# Patient Record
Sex: FEMALE | URBAN - METROPOLITAN AREA
[De-identification: names, ages, dates, MRNs, and addresses within clinical notes are randomized per-mention and may not be internally consistent; named-entity substitution may affect disease eponyms.]

---

## 2019-12-11 ENCOUNTER — NEW PATIENT (OUTPATIENT)
Dept: URBAN - METROPOLITAN AREA CLINIC 48 | Facility: CLINIC | Age: 44
End: 2019-12-11

## 2019-12-11 DIAGNOSIS — H25.13: ICD-10-CM

## 2019-12-11 DIAGNOSIS — D48.5: ICD-10-CM

## 2019-12-11 PROCEDURE — 99204 OFFICE O/P NEW MOD 45 MIN: CPT | Mod: 25

## 2019-12-11 PROCEDURE — 67810 INCAL BX EYELID SKN LID MRGN: CPT

## 2019-12-11 ASSESSMENT — TONOMETRY
OD_IOP_MMHG: 21
OS_IOP_MMHG: 20

## 2019-12-11 ASSESSMENT — VISUAL ACUITY
OS_CC: 20/20-2
OD_CC: 20/25

## 2021-08-12 ENCOUNTER — OFFICE VISIT (OUTPATIENT)
Dept: PSYCHOLOGY | Facility: CLINIC | Age: 46
End: 2021-08-12
Payer: COMMERCIAL

## 2021-08-12 ENCOUNTER — OFFICE VISIT (OUTPATIENT)
Dept: PSYCHIATRY | Facility: CLINIC | Age: 46
End: 2021-08-12
Payer: COMMERCIAL

## 2021-08-12 VITALS
BODY MASS INDEX: 38.99 KG/M2 | TEMPERATURE: 97.5 F | DIASTOLIC BLOOD PRESSURE: 73 MMHG | HEIGHT: 65 IN | RESPIRATION RATE: 18 BRPM | SYSTOLIC BLOOD PRESSURE: 119 MMHG | WEIGHT: 234 LBS | HEART RATE: 91 BPM

## 2021-08-12 DIAGNOSIS — F31.4 BIPOLAR DISORDER, CURRENT EPISODE DEPRESSED, SEVERE, WITHOUT PSYCHOTIC FEATURES (HCC): Primary | ICD-10-CM

## 2021-08-12 DIAGNOSIS — F43.10 POST TRAUMATIC STRESS DISORDER (PTSD): ICD-10-CM

## 2021-08-12 PROBLEM — I10 HYPERTENSION: Status: ACTIVE | Noted: 2021-08-12

## 2021-08-12 PROBLEM — F31.9 AFFECTIVE PSYCHOSIS, BIPOLAR (HCC): Status: ACTIVE | Noted: 2021-08-12

## 2021-08-12 PROCEDURE — 90792 PSYCH DIAG EVAL W/MED SRVCS: CPT | Performed by: PSYCHIATRY & NEUROLOGY

## 2021-08-12 RX ORDER — FAMOTIDINE 20 MG/1
20 TABLET, FILM COATED ORAL
COMMUNITY
Start: 2021-07-18

## 2021-08-12 RX ORDER — LITHIUM CARBONATE 300 MG/1
300 CAPSULE ORAL
COMMUNITY
Start: 2021-08-10

## 2021-08-12 RX ORDER — LITHIUM CARBONATE 600 MG/1
600 CAPSULE ORAL 2 TIMES DAILY
COMMUNITY
Start: 2021-08-10

## 2021-08-12 RX ORDER — FLUTICASONE PROPIONATE 50 MCG
1 SPRAY, SUSPENSION (ML) NASAL DAILY
COMMUNITY

## 2021-08-12 RX ORDER — MONTELUKAST SODIUM 10 MG/1
10 TABLET ORAL
COMMUNITY
Start: 2021-07-15

## 2021-08-12 RX ORDER — PRAZOSIN HYDROCHLORIDE 2 MG/1
4 CAPSULE ORAL
COMMUNITY
Start: 2021-08-10

## 2021-08-12 RX ORDER — CLINDAMYCIN PHOSPHATE 10 UG/ML
1 LOTION TOPICAL 2 TIMES DAILY
COMMUNITY
Start: 2021-03-01 | End: 2022-03-01

## 2021-08-12 RX ORDER — PANTOPRAZOLE SODIUM 40 MG/1
40 TABLET, DELAYED RELEASE ORAL DAILY
COMMUNITY

## 2021-08-12 RX ORDER — CARIPRAZINE 1.5 MG/1
1.5 CAPSULE, GELATIN COATED ORAL DAILY
COMMUNITY
Start: 2021-08-10 | End: 2021-09-13 | Stop reason: SDUPTHER

## 2021-08-12 RX ORDER — SPIRONOLACTONE 25 MG/1
25 TABLET ORAL DAILY
COMMUNITY
Start: 2021-07-18

## 2021-08-12 RX ORDER — QUINIDINE GLUCONATE 324 MG
27 TABLET, EXTENDED RELEASE ORAL DAILY
COMMUNITY

## 2021-08-12 RX ORDER — GABAPENTIN 600 MG/1
600 TABLET ORAL
COMMUNITY
Start: 2021-07-18

## 2021-08-12 RX ORDER — AMLODIPINE BESYLATE 10 MG/1
10 TABLET ORAL DAILY
COMMUNITY
Start: 2021-07-18
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
## 2021-08-12 NOTE — PSYCH
Assessment/Plan:      Diagnoses and all orders for this visit:    Bipolar disorder, current episode depressed, severe, without psychotic features ([facility] [address] )    Post traumatic stress disorder (PTSD)          Subjective:     Patient ID: Joycelyn Ng is a 51 y o  female  [facility] Treatment Plan   AREAS OF NEED: Bipolar disorder and PTSD as evidenced by increased anxiety, depression, suicidal attempts on medications and isolation due to limited supports and separation from  and traumatic history  Date Initiated: 08/12/21    Strengths: "smart, kind, hard working, determined and resilient"     LONG TERM GOAL:   Date Initiated: 08/12/21  1 0 I will identify 3 ways my suicidal ideation and pseudoseizures have subsided  Target Date: 09/09/21  Completion Date:       SHORT TERM OBJECTIVES:     Date Initiated: 08/12/21  1 1 I will learn 3 coping skills to decrease my anxiousness and depressive symptoms  Revision Date:   Target Date: 08/24/21  Completion Date:     Date Initiated: 08/12/21  1 2 I will identify 3 ways that I have attempted reconnections with my children  Revision Date:   Target Date: 08/24/21  Completion Date:    Date Initiated: 08/12/21  1 3 I will take medications as prescribed and share questions and concerns if arise  Revision Date:  Target Date: 08/24/21  Completion Date:     Date Initiated: 08/12/21  1 4 I will identify 3 ways my supports can assist in my recovery and agree to staff/support contact as indicated      Revision Date:  Target Date: 08/24/21  Completion Date:          7 DAY REVISION:    Date Initiated:  Revision Date:   Target Date:   Completion Date:      PSYCHIATRY:  Date Initiated:  08/12/21  Medication Management and Education       Revision Date:       The person(s) responsible for carrying out the plan is Tena ePrez MD    NURSING/SYMPTOM EDUCATION:  Date Initiated: 08/12/21       1 1, 1 2  1 3, 1 4 Provide wellness/symptoms and skill education groups three to five days weekly to educate [place] Corre[place]  on signs and symptoms of diagnoses, skills to manage stressors, and medication questions that will be addressed by the treatment team         Revision date: The person(s) responsible for carrying out the plan is NANCY Turner, HAILEY and Dougie Hsu [place]    PSYCHOLOGY:   Date Initiated: 08/12/21       1 1, 1 2, 1 4 Provide psychotherapy group 5 times per week to allow opportunity for Joycelyn Varghese  to explore stressors and ways of coping  Revision Date:   The person(s) responsible for carrying out the plan is Dinesh Pereira [place]    ALLIED THERAPY:   Date Initiated: 08/12/21  1 1,1 2 Engage Joycelyn Varghese in AT group 5 times daily to encourage development and use of wellness tools to decrease symptoms and promote recovery through meaningful activity  Revision Date:       The person(s) responsible for carrying out the plan is ADAMA Jacobs and ADAMA Zazueta    CASE MANAGEMENT:   Date Initiated: 08/12/21      1 0 This  will meet with Joycelyn Varghese  3-4 times weekly to assess treatment progress, discharge planning, connection to community supports and UR as indicated  Revision Date:   The person(s) responsible for carrying out the plan is Darrick Guerin MA, [place]    TREATMENT REVIEW/COMMENTS:     DISCHARGE CRITERIA: Identify 3 signs of progress and complete relapse prevention plan  DISCHARGE PLAN: Connect with identified outpatient providers  Estimated Length of Stay: 10 treatment days       Diagnosis and Treatment Plan explained to April, April relates understanding diagnosis and is agreeable to Treatment Plan           CLIENT COMMENTS / Please share your thoughts, feelings, need and/or experiences regarding your treatment plan: _____________________________________________________________________________________________________________________________________________________________________________________________________________________________________________________________________________________________________________________ Date/Time: ______________     Patient Signature: _________________________________     Date/Time: ______________      Signature: _________________________________    Date/Time: ______________

## 2021-08-12 NOTE — PSYCH
Subjective:     Patient ID: April Laure Meckel is a 51 y o  female  [facility] Clinical Progress Notes      Specialized Services Documentation  Therapist must complete separate progress note for each specific clinical activity in which the individual participated during the day  Other ([id number]) Spoke with Tapan Sanon from [employer] with a contact number [phone], using Tax ID [id number] Location address [address] [place] [address] [id number] not available for authorization  Joycelyn Varghese was authorized 7 days from 08/13/21 to 08/23/21 with an authorization code of [id number]  Case Management Note    Dinesh Pereira [place]    Current suicide risk : Low    ([id number]) Met with Joycelyn Varghese  Reviewed program and given on call number  she completed releases and OP providers/ PCP notified of admission and health care coordination form completed  Completed initial psycho-social evaluation and initial treatment goals discussed  Medications changes/added/denied? No - See Dr Cas Littlejohn Note    Treatment session number: Assessment only    Individual Case Management Visit provided today?  No    [facility] follow up physician's orders: Admit to PHP - See Dr Cas Littlejohn Note

## 2021-08-13 ENCOUNTER — OFFICE VISIT (OUTPATIENT)
Dept: PSYCHOLOGY | Facility: CLINIC | Age: 46
End: 2021-08-13
Payer: COMMERCIAL

## 2021-08-13 DIAGNOSIS — F31.4 BIPOLAR DISORDER, CURRENT EPISODE DEPRESSED, SEVERE, WITHOUT PSYCHOTIC FEATURES (HCC): Primary | ICD-10-CM

## 2021-08-13 DIAGNOSIS — F43.10 POST TRAUMATIC STRESS DISORDER (PTSD): ICD-10-CM

## 2021-08-13 PROCEDURE — G0177 OPPS/PHP; TRAIN & EDUC SERV: HCPCS

## 2021-08-13 PROCEDURE — G0410 GRP PSYCH PARTIAL HOSP 45-50: HCPCS

## 2021-08-13 PROCEDURE — G0176 OPPS/PHP;ACTIVITY THERAPY: HCPCS

## 2021-08-13 NOTE — PSYCH
Subjective:     Patient ID: Joycelyn Lantigua is a 51 y o  female  [facility] Clinical Progress Notes      Specialized Services Documentation  Therapist must complete separate progress note for each specific clinical activity in which the individual participated during the day  Allied Therapy   [id number] April Sushila Varghese  actively shared in [facility] group exploring DBT skill changing emotional responses  April engaged in task sharing triggers and responses to given emotions  Group explored differences between justified and unjustified emotions to prompting events and effective versus ineffective responses  With weekend approach, group explored risk of feeling lazy and she was able to identify risks of inactivity and the need to encourage herself to stay active  Group reviewed skill Opposite Action related to depression withdraw versus get active and productive weekend choices offered  Some beginning effort and progress noted toward goals  Continue AT to explore healthy emotional regulation and role in practicing wellness tools   Tx Plan Objective: 1 1,1 2, Therapist:  Genia GALAN

## 2021-08-13 NOTE — PSYCH
Subjective:     Patient ID: April Laure Meckel is a 51 y o  female  [facility] Clinical Progress Notes      Specialized Services Documentation  Therapist must complete separate progress note for each specific clinical activity in which the individual participated during the day  Group Psychotherapy [id number] April participated in a group that started with a mindfulness technique of progressive muscle relaxation  After finishing our mindfulness exercise April participated in an open discussion card game called self-care empowerment  Each card would express or impose a question or way to empower an area of their life  April will continue with life skills and psychotherapy groups  Good progress made towards treatment  Tx Plan Objective: 1 1,1 2 Therapist:  Dougie Hsu, [facility]    Education Therapy   [phone] Contreras Varghese actively shared in morning assessment and goal review  Presented as Receptive related to readiness to learn  Contreras Varghese did complete goal from last treatment day identifying gaining hope  did not present with any barriers to learning  [phone] April Sushila Varghese engaged throughout the treatment day  Was engaged in learning related to Illness, Medication, Aftercare and Wellness Tools  Staff utilized Verbal, Written, A/V and Demonstration teaching methods  Joycelyn Varghese shared area of learning and set a goal for outside of program to relax practicing Mindfulness skills        Tx Plan Objective: 1 1,1 2 Therapist:  VANESSA Luis

## 2021-08-13 NOTE — PSYCH
Subjective:     Patient ID: Joycelyn Lopez is a 51 y o  female  [facility] Clinical Progress Notes      Specialized Services Documentation  Therapist must complete separate progress note for each specific clinical activity in which the individual participated during the day  GROUP PSYCHOTHERAPY ([id number])  The group engaged in the wellness assessment that evaluates progress on several different areas of wellness: physical, emotional, cognitive, vocational, social and spiritual  Clients rated their progress and discussed areas that need work  April continues to make progress towards goals through verbal participation in group  Continue with psychotherapy  TX Plan Objectives: 1 1, 1 2, 1 4  Therapist: Amanda Dickson MA, [place]     Other Spoke with representative from [facility]  Stated there is no waitlist for dual residential treatment for women at this time  Case Management Note    Amanda Dickson, [facility] [place], [place]    Current suicide risk : Low     ([id number]) April shared that she is worried that PHP may not be the right program for her  She shared that she gave it thought and she is agreeable to trying more days to see if the anxiety was from it being a new experience  April talked about residential being a better fit but she didn't know where she could go  Provided information on [facility] treatment center  Medications changes/added/denied? No    Treatment session number: 1    Individual Case Management Visit provided today? Yes     [facility] follow up physician's orders: None at this time

## 2021-08-16 ENCOUNTER — OFFICE VISIT (OUTPATIENT)
Dept: PSYCHOLOGY | Facility: CLINIC | Age: 46
End: 2021-08-16
Payer: COMMERCIAL

## 2021-08-16 DIAGNOSIS — F31.4 BIPOLAR DISORDER, CURRENT EPISODE DEPRESSED, SEVERE, WITHOUT PSYCHOTIC FEATURES (HCC): Primary | ICD-10-CM

## 2021-08-16 DIAGNOSIS — F43.10 POST TRAUMATIC STRESS DISORDER (PTSD): ICD-10-CM

## 2021-08-16 PROCEDURE — G0410 GRP PSYCH PARTIAL HOSP 45-50: HCPCS

## 2021-08-16 PROCEDURE — G0177 OPPS/PHP; TRAIN & EDUC SERV: HCPCS

## 2021-08-16 PROCEDURE — G0176 OPPS/PHP;ACTIVITY THERAPY: HCPCS

## 2021-08-16 NOTE — PSYCH
Subjective:     Patient ID: Joycelyn Mosley is a 51 y o  female  [facility] Clinical Progress Notes      Specialized Services Documentation  Therapist must complete separate progress note for each specific clinical activity in which the individual participated during the day  Allied Therapy   [id number] Joycelyn Varghese actively shared and participated in [facility] group focused on using chants and mantras for empowerment  Joycelyn Varghese engaged in active music making, vocalizing, chant writing, and group discussion  Group explored practice of learning what a chant is, when to use chants in their life, and two specific chants with melodies they can use in their daily life  German Anandjerica Castaneday identified that she felt very connected during group  She stated she felt very empowered and stress free during the chant exercise  Some effort noted toward treatment goal  Continue AT to encourage the development and practice of chants and mantras to alleviate symptoms and support wellness    Tx Plan Objective: 1 1, Therapist:  ADAMA Stokes

## 2021-08-16 NOTE — PSYCH
Subjective:     Patient ID: Joycelyn Howell is a 51 y o  female  [facility] Clinical Progress Notes      Specialized Services Documentation  Therapist must complete separate progress note for each specific clinical activity in which the individual participated during the day  GROUP PSYCHOTHERAPY ([id number]) Members were asked Meme Monroe are you grateful for?  They were given a prompt to complete to help them recognize different areas they could show some gratitude  The group was educated on the importance of being grateful and effective times to utilize this wellness tool  Members actively engaged in discussions about overcoming challenges and areas that they have demonstrated strengths and progress  April presented as insightful, evident by identifying that when she is not well it is harder to identify areas of gratitude  Encouraged to try to list gratitude even though she may be feeling an emotion  Joycelyn Conti Deputy continues to demonstrate insight and progress through activity participation and verbal disclosures in group  Continue with psychotherapy  TX Plan Objectives: 1 1, 1 2, 1 4   Therapist: John Summers MA, [place]     Case Management Note    John Summers MA, [place]    Current suicide risk : Low     A case management session is not scheduled today with Joycelyn Varghese; additionally, they did not request a CM meeting  Next scheduled session is 08/17/21  Medications changes/added/denied? No    Treatment session number: 2     Individual Case Management Visit provided today?  No    [facility] follow up physician's orders: None at this time

## 2021-08-16 NOTE — PSYCH
Subjective:     Patient ID: Joycelyn Howell is a 51 y o  female  [facility] Clinical Progress Notes      Specialized Services Documentation  Therapist must complete separate progress note for each specific clinical activity in which the individual participated during the day  Group Psychotherapy   [id number] Joycelyn Varghese actively participated in the [facility] group focused on increasing awareness to emotions  Joycelyn Varghese engaged in a minor analysis as well as a drumming exercise where group members were asked to express a given emotion through playing a drum  Group explored healthy ways to express emotions as well as how to put it into practice  Joycelyn Varghese shared that she struggled with feeling emotions and learned the PLEASE acronym  Good effort noted toward treatment goal  Continue PT to encourage development and practice of expressing emotions  Tx Plan Objective: 1 1,1 2, Therapist:  Reynaldo JONESBC; GLADIS Rogel    Education Therapy   [phone] Joycelyn Varghese actively shared in morning assessment and goal review  Presented as Receptive related to readiness to learn  Chente Person  did complete goal from last treatment day identifying gaining hope  April did not present with any barriers to learning  [phone] Joycelyn Varghese engaged throughout the treatment day  Was engaged in learning related to Illness, Medication, Aftercare and Wellness Tools  Staff utilized Verbal, Written, A/V and Demonstration teaching methods    Joycelyn Varghese shared area of learning and set a goal for outside of program to revisit gratitude journal       Tx Plan Objective: 1 1,1 2, Therapist:  John Summers MA, [facility]; GLADIS Rogel

## 2021-08-17 ENCOUNTER — OFFICE VISIT (OUTPATIENT)
Dept: PSYCHOLOGY | Facility: CLINIC | Age: 46
End: 2021-08-17
Payer: COMMERCIAL

## 2021-08-17 DIAGNOSIS — F31.4 BIPOLAR DISORDER, CURRENT EPISODE DEPRESSED, SEVERE, WITHOUT PSYCHOTIC FEATURES (HCC): Primary | ICD-10-CM

## 2021-08-17 DIAGNOSIS — F43.10 POST TRAUMATIC STRESS DISORDER (PTSD): ICD-10-CM

## 2021-08-17 PROCEDURE — G0177 OPPS/PHP; TRAIN & EDUC SERV: HCPCS

## 2021-08-17 PROCEDURE — G0176 OPPS/PHP;ACTIVITY THERAPY: HCPCS

## 2021-08-17 PROCEDURE — G0410 GRP PSYCH PARTIAL HOSP 45-50: HCPCS

## 2021-08-17 NOTE — PSYCH
Subjective:     Patient ID: April Deana Howell is a 51 y o  female  [facility] Clinical Progress Notes      Specialized Services Documentation  Therapist must complete separate progress note for each specific clinical activity in which the individual participated during the day  Group Psychotherapy Life Skills(9:30-10:30)  Group members discussed the importance of mindfulness and talking about a time when they were mindful  Group members played the mindfulness game which consisted of true/false statements, visualization cards, and mindfulness practice cards  Group members engaged in mindfulness activities  April continues to make progress towards goals through verbal participation in group; to accomplish long term goals continue to utilize skills learned in programming  Continue with psychotherapy to educate and encourage use of wellness tools   Tx Plan Objective: 1 1,1 2 Therapist: Angel Smith and co-led by Juan Jose Lovett

## 2021-08-17 NOTE — PSYCH
Subjective:     Patient ID: Joycelyn Campos is a 51 y o  female  [facility] Clinical Progress Notes      Specialized Services Documentation  Therapist must complete separate progress note for each specific clinical activity in which the individual participated during the day  GROUP PSYCHOTHERAPY ([id number])  The group was educated on the grounding technique called Emotional Freedom Tapping (EFT)  Members practiced EFT by following along to a prompt titled "Overcoming Exhaustion and 3 Minute for Anxiety"  Members rated the intensity of their thoughts before and after participating in the tapping exercise  Group discussed scenarios where they could use the technique, how to adapt the practice to their needs and understanding insights from past  April continues to make progress towards goals through verbal participation and engagement in activity during group  Continue with Psychotherapy  [facility] Objectives: 1 1, 1 2, 1 4   Therapist: Mauricio Storey MA, [place], co-led by Webb Apgar, MTI     Case Management Note    Mauricio Storey MA, [place]    Current suicide risk : Low     A case management session is not scheduled today with April Sushila Varghese; additionally, they did not request a CM meeting  Next scheduled session is 21  Medications changes/added/denied? No    Treatment session number: 3    Individual Case Management Visit provided today?  No    [facility] follow up physician's orders: None at this time

## 2021-08-17 NOTE — PSYCH
Subjective:     Patient ID: Joycelyn De La Cruz is a 51 y o  female  [facility] Clinical Progress Notes      Specialized Services Documentation  Therapist must complete separate progress note for each specific clinical activity in which the individual participated during the day  Allied Therapy   [id number]- Edmundo Varghese actively shared and participated in [facility] group focused on using chants and mantras for assisting in triggering an alternative behavior (I e  I will ______________, I will not _____________ ) Participants created 4 different chants related to identifying a 2 part statement of what they will engage in and not engage in, a mantra to encourage deep breathing when feeling overwhelmed, and a mantra related to outlining a coping skill, and an affirmation chant  Joycelyn Varghese engaged in active music making, vocalizing, chant writing, and group discussion  Group explored practice of learning what a chant is, creating their own chants through song writing, and having an open discussion about the process  Edmundo Varghese identified utilizing both the body for percussion and chanting a calming mantra is helpful as it slows down the brain and decreases its ability for ruminating as it requires a lot of focus  Each participant identified an accountability plan of when they will use their chant by identifying where and when they will be using the chant for the next 6 months, as well as how they can keep themselves accountable (I e  setting an alarm on their phone, having a person ask them if they used their chant, etc )  Some effort noted toward treatment goal   Continue AT to encourage the development and practice of chants and mantras to alleviate symptoms and support wellness    Tx Plan Objective: 1 1, Therapist:  ADAMA Hendricks, GLADIS Villanueva

## 2021-08-18 ENCOUNTER — OFFICE VISIT (OUTPATIENT)
Dept: PSYCHOLOGY | Facility: CLINIC | Age: 46
End: 2021-08-18
Payer: COMMERCIAL

## 2021-08-18 ENCOUNTER — OFFICE VISIT (OUTPATIENT)
Dept: PSYCHIATRY | Facility: CLINIC | Age: 46
End: 2021-08-18
Payer: COMMERCIAL

## 2021-08-18 DIAGNOSIS — F31.9 BIPOLAR 1 DISORDER (HCC): Primary | ICD-10-CM

## 2021-08-18 DIAGNOSIS — F43.10 POST TRAUMATIC STRESS DISORDER (PTSD): ICD-10-CM

## 2021-08-18 DIAGNOSIS — F31.4 BIPOLAR DISORDER, CURRENT EPISODE DEPRESSED, SEVERE, WITHOUT PSYCHOTIC FEATURES (HCC): Primary | ICD-10-CM

## 2021-08-18 PROCEDURE — G0410 GRP PSYCH PARTIAL HOSP 45-50: HCPCS

## 2021-08-18 PROCEDURE — 99214 OFFICE O/P EST MOD 30 MIN: CPT | Performed by: NURSE PRACTITIONER

## 2021-08-18 RX ORDER — BENZTROPINE MESYLATE 0.5 MG/1
0.5 TABLET ORAL 2 TIMES DAILY
Qty: 60 TABLET | Refills: 2 | Status: SHIPPED | OUTPATIENT
Start: 2021-08-18

## 2021-08-18 NOTE — PSYCH
Subjective:     Patient ID: Joycelyn Slaughter is a 51 y o  female  [facility] Clinical Progress Notes      Specialized Services Documentation  Therapist must complete separate progress note for each specific clinical activity in which the individual participated during the day  Allied Therapy  [id number] April Sushila Varghese did not attend due to feeling ill  Education Therapy   [id number] Sohan Varghese actively shared in morning assessment and goal review  Presented as Receptive related to readiness to learn  April Sushila Varghese did complete goal from last treatment day identifying gaining responsibility, support and empowerment  April did not present with any barriers to learning  [phone] April Sushila Varghese did not attend due to feeling ill

## 2021-08-18 NOTE — PSYCH
PHP MEDICATION MANAGEMENT NOTE        [facility]    Name and Date of Birth:  Joycelyn Varghese 51 y o  1975 MRN: [id number]    Date of Visit: August 18, 2021    Allergies   Allergen Reactions    Amoxicillin Hives and Rash     SUBJECTIVE:    April is seen today for a follow up for Bipolar Disorder and PTSD  She reports that she has improved slightly since beginning PHP  Patient feels that Gemma Noa has been helpful in alleviating feelings of depression  Patient continues to experience SI at times  "It's like a bad coping mechanism that help me feel better "  In addition she is experiencing some medication side effects  Patient is visibly restless and reports restlessness at nighttime interfering with sleep  Has had difficulty with akathisia in the past from atypical antipsychotic such as Latuda  Patient has also had discharge from breasts and atypical menstrual cycle since starting Vraylar  She also experiences GI upset and diarrhea  Diarrhea onset may coincide with increase in lithium  Patient will continue to monitor  Will start Cogentin to address akathisia and continue to monitor the patient  She denies any side effects from medications  PLAN:    Initiate Cogentin 0 5 mg p o  b i d    continue all other psychiatric medications as ordered   continue consider the risk benefits of Gemma Noa especially in regards to side effect profile akathesia and reproductive system dysfunction  Aware of 24 hour and weekend coverage for urgent situations accessed by calling [facility] main practice number  Continue partial hospitalization program    Diagnoses and all orders for this visit:    Bipolar 1 disorder ([facility] [address] )  -     benztropine (COGENTIN) 0 5 mg tablet;  Take 1 tablet (0 5 mg total) by mouth 2 (two) times a day    Post traumatic stress disorder (PTSD)        Current Outpatient Medications on File Prior to Visit   Medication Sig Dispense Refill    Advair Diskus 500-50 MCG/DOSE inhaler Inhale 1 puff 2 (two) times a day      amLODIPine (NORVASC) 10 mg tablet Take 10 mg by mouth daily      Cholecalciferol 25 MCG (1000 UT) tablet Take 1,000 Units by mouth      clindamycin (CLEOCIN T) 1 % lotion Apply 1 application topically 2 (two) times a day      famotidine (PEPCID) 20 mg tablet Take 20 mg by mouth daily at bedtime      ferrous gluconate (FERGON) 240 (27 FE) MG tablet Take 27 mg by mouth daily      fluticasone (FLONASE) 50 mcg/act nasal spray 1 spray into each nostril daily      gabapentin (NEURONTIN) 600 MG tablet Take 600 mg by mouth daily at bedtime      lithium 600 MG capsule Take 600 mg by mouth 2 (two) times a day      lithium carbonate 300 mg capsule Take 300 mg by mouth daily at bedtime      montelukast (SINGULAIR) 10 mg tablet Take 10 mg by mouth daily at bedtime      pantoprazole (PROTONIX) 40 mg tablet Take 40 mg by mouth daily      prazosin (MINIPRESS) 2 mg capsule Take 4 mg by mouth daily at bedtime      spironolactone (ALDACTONE) 25 mg tablet Take 25 mg by mouth daily      Vraylar 1 5 MG capsule Take 1 5 mg by mouth daily       No current facility-administered medications on file prior to visit  Psychotherapy Provided:     Individual psychotherapy provided: Yes  Counseling was provided during the session today for 16 minutes  Supportive counseling provided  HPI ROS Appetite Changes and Sleep:     She reports difficulty falling asleep, adequate appetite, low energy  yes, no plan   States passive SI without plan or intent    Review Of Systems:      General emotional problems, decreased functioning, sleep disturbances and marital problems   Personality no change in personality   Constitutional negative   ENT negative   Cardiovascular negative   Respiratory negative   Gastrointestinal negative   Genitourinary negative   Musculoskeletal negative   Integumentary negative   Neurological negative   Endocrine negative   Other Symptoms none, all other systems are negative     Mental Status Evaluation:    Appearance Adequate hygiene and grooming   Behavior cooperative and psychomotor agitation   Mood anxious and depressed  Depression Scale -  of 10 (0 = No depression)  Anxiety Scale -  of 10 (0 = No anxiety)   Speech Normal rate and volume   Affect appropriate and mood-congruent   Thought Processes Goal directed and coherent   Thought Content Does not verbalize delusional material   Associations Tightly connected   Perceptual Disturbances Denies hallucinations and does not appear to be responding to internal stimuli   Risk Potential Suicidal/Homicidal Ideation - No evidence of suicidal or homicidal ideation and patient does not verbalize suicidal or homicidal ideation  Risk of Violence - No evidence of risk for violence found on assessment  Risk of Self Mutilation - No evidence of risk for self mutilation found on assessment   Orientation oriented to person, place, time/date and situation   Memory recent and remote memory grossly intact   Consciousness alert and awake   Attention/Concentration attention span and concentration are age appropriate   Insight intact   Judgement intact   Muscle Strength and Gait normal muscle strength and normal muscle tone, normal gait/station and normal balance   Motor Activity no abnormal movements   Language no difficulty naming common objects, no difficulty repeating a phrase, no difficulty writing a sentence   Fund of Knowledge adequate knowledge of current events  adequate fund of knowledge regarding past history  adequate fund of knowledge regarding vocabulary      Past Psychiatric History Update:     Inpatient Psychiatric Admission Since Last Encounter:   no  Suicide Attempt Or Self Mutilation Since Last Encounter:   no  Incidence of Violent Behavior Since Last Encounter:   no    Traumatic History Update:     New Onset of Abuse Since Last Encounter:   no  Traumatic Events Since Last Encounter:   no    Past Medical History:    Past Medical History:   Diagnosis Date    Head injury      Past Medical History Pertinent Negatives:   Diagnosis Date Noted    Seizures ([facility] [address] ) 2021     Past Surgical History:   Procedure Laterality Date    BACK SURGERY       SECTION      CYST REMOVAL       Allergies   Allergen Reactions    Amoxicillin Hives and Rash     Substance Abuse History:    Social History     Substance and Sexual Activity   Alcohol Use Not Currently    Comment: 2021     Social History     Substance and Sexual Activity   Drug Use Not Currently     Social History:    Social History     Socioeconomic History    Marital status: /Civil Union     Spouse name: Not on file    Number of children: 3    Years of education: Not on file    Highest education level: Bachelor's degree (e g , BA, AB, BS)   Occupational History    Occupation: short term disability   Tobacco Use    Smoking status: Current Some Day Smoker     Types: Cigarettes    Smokeless tobacco: Current User     Types: Snuff    Tobacco comment: 1/2 a pack a week   Vaping Use    Vaping Use: Every day    Substances: Nicotine   Substance and Sexual Activity    Alcohol use: Not Currently     Comment: 2021    Drug use: Not Currently    Sexual activity: Not on file   Other Topics Concern    Not on file   Social History Narrative    Not on file     Social Determinants of Health     Financial Resource Strain:     Difficulty of Paying Living Expenses:    Food Insecurity:     Worried About Running Out of Food in the Last Year:     [address] in the Last Year:    Transportation Needs:     Lack of Transportation (Medical):      Lack of Transportation (Non-Medical):    Physical Activity:     Days of Exercise per Week:     Minutes of Exercise per Session:    Stress:     Feeling of Stress :    Social Connections:     Frequency of Communication with Friends and Family:     Frequency of Social Gatherings with Friends and Family:     Attends Baptism Services:     Active Member of Clubs or Organizations:     Attends Club or Organization Meetings:     Marital Status:    Intimate Partner Violence:     Fear of Current or Ex-Partner:     Emotionally Abused:     Physically Abused:     Sexually Abused:      Family Psychiatric History:     Family History   Problem Relation Age of Onset    Alcohol abuse Mother     Post-traumatic stress disorder Father     Depression Sister     Depression Brother     Completed Suicide  Paternal Aunt      History Review: The following portions of the patient's history were reviewed and updated as appropriate: allergies, current medications, past family history, past medical history, past social history, past surgical history and problem list     OBJECTIVE:     Vital signs in last 24 hours: There were no vitals filed for this visit  Laboratory Results: I have personally reviewed all pertinent laboratory/tests results  Medications Risks/Benefits:      Risks, Benefits And Possible Side Effects Of Medications:    Discussed risks and benefits of treatment with patient including risk of suicidality, serotonin syndrome and SIADH related to treatment with antidepressants; Risk of induction of manic symptoms in certain patient populations     Controlled Medication Discussion:     Not applicable    EVA Rose 08/18/21    This note was shared with patient

## 2021-08-18 NOTE — PSYCH
Subjective:     Patient ID: Jocyelyn Ng is a 51 y o  female  [facility] Clinical Progress Notes      Specialized Services Documentation  Therapist must complete separate progress note for each specific clinical activity in which the individual participated during the day  GROUP PSYCHOTHERAPY ([id number]) Members were asked 2 questions to discuss and share responses with the group  1  On a scale from 1 to 10, where are you at in your recovery/wellness journey and what does that number mean to you?   2  Share one thing you would like to improve about yourself and discuss one way you can accomplish this  Each member discuss where they fell on the scale and what would be needed to increase their number by a half a point  The group received a safe and non-judgmental space to discuss concerns and celebrate victories  Open discussion revolved around gaining information on the following areas: W R A P , mindfulness and mental health advanced directives  April initiated the discussion about mental health advanced directives to be prepared for relapse or back slides  Rosella Goldmann April Sushila Palisades continues to demonstrate insight and progress through activity participation and verbal disclosures in group  Continue with psychotherapy  TX Plan Objectives: 1 1, 1 2, 1 4   Therapist: Gen Moreno MA, [place]        Case Management Note    Dinesh Rivas [place]    Current suicide risk : Low     ([id number]) April shared that she had been having stomach issues all throughout the morning  She stated she was going to try to stay for the day but she may have to leave if it gets too bad  (4368) Shared that she was not feeling well and that she was going to leave for the day  Stated she will be in program tomorrow  Medications changes/added/denied? Yes - See Jessica Samayoa' Note    Treatment session number: 0    Individual Case Management Visit provided today?  Yes     [facility] follow up physician's orders: None at this time

## 2021-08-18 NOTE — PSYCH
Subjective:     Patient ID: Joycelyn Mosley is a 51 y o  female  [facility] Clinical Progress Notes      Specialized Services Documentation  Therapist must complete separate progress note for each specific clinical activity in which the individual participated during the day  Group Psychotherapy Life Skills (12:30-1:30) Joycelyn Conti  was not present in group which focused on hope  April left program early due to being sick    Tx Plan Objective: NA Therapist: Fareed Honeycutt

## 2021-08-19 ENCOUNTER — OFFICE VISIT (OUTPATIENT)
Dept: PSYCHOLOGY | Facility: CLINIC | Age: 46
End: 2021-08-19
Payer: COMMERCIAL

## 2021-08-19 DIAGNOSIS — F43.10 POST TRAUMATIC STRESS DISORDER (PTSD): ICD-10-CM

## 2021-08-19 DIAGNOSIS — F31.4 BIPOLAR DISORDER, CURRENT EPISODE DEPRESSED, SEVERE, WITHOUT PSYCHOTIC FEATURES (HCC): Primary | ICD-10-CM

## 2021-08-19 PROCEDURE — G0410 GRP PSYCH PARTIAL HOSP 45-50: HCPCS

## 2021-08-19 PROCEDURE — G0176 OPPS/PHP;ACTIVITY THERAPY: HCPCS

## 2021-08-19 PROCEDURE — G0177 OPPS/PHP; TRAIN & EDUC SERV: HCPCS

## 2021-08-19 NOTE — PSYCH
Subjective:     Patient ID: Joycelyn Saucedo is a 51 y o  female  [facility] Clinical Progress Notes      Specialized Services Documentation  Therapist must complete separate progress note for each specific clinical activity in which the individual participated during the day  Education Therapy   [id number] Osmin Katienaun  actively shared in morning assessment and goal review  Presented as Receptive related to readiness to learn  Osmin Nela Castaneday did complete goal from last treatment day identifying gaining support  April did not present with any barriers to learning  [id number] Joycelyn Varghese engaged throughout the treatment day  Was engaged in learning related to Illness, Medication, Aftercare and Wellness Tools  Staff utilized Verbal, Written, A/V and Demonstration teaching methods  April Sushila Castaneday shared area of learning and set a goal for outside of program to take her kid on a walk        Tx Plan Objective: 1 1,1 2, Therapist:  NANCY Rudd; GLADIS Graham

## 2021-08-19 NOTE — PSYCH
Subjective:     Patient ID: Joycelyn Feliciano is a 51 y o  female  [facility] Clinical Progress Notes      Specialized Services Documentation  Therapist must complete separate progress note for each specific clinical activity in which the individual participated during the day  GROUP PSYCHOTHERAPY ([id number]) Members requested to talk about how to approach returning to work after being in treatment  Others offered suggestions on how to phrase statements if questioned  Members were educated on protected health information and advocating for self in the workplace  The group brainstormed an A-Z list of coping skills that they would be able to use when they are overwhelmed at work or in their daily lives  Joycelyn Conti  continues to demonstrate insight and progress through verbal participation in group  Continue with psychotherapy  TX Plan Objectives: 1 1, 1 2, 1 4   Therapist: Tiara Cantrell MA, [place]     Case Management Note    Tiara Cantrell MA, [place]    Current suicide risk : Low     ([phone]) April discussed the CM about the possibility of a family session  April discussed with CM about what is next in terms of treatment  Encouraged April to think about what she wants and start to discuss it with family about needs  Medications changes/added/denied? No    Treatment session number: 4    Individual Case Management Visit provided today? Yes     [facility]ations follow up physician's orders: None at this time

## 2021-08-20 ENCOUNTER — OFFICE VISIT (OUTPATIENT)
Dept: PSYCHOLOGY | Facility: CLINIC | Age: 46
End: 2021-08-20
Payer: COMMERCIAL

## 2021-08-20 DIAGNOSIS — F43.10 POST TRAUMATIC STRESS DISORDER (PTSD): ICD-10-CM

## 2021-08-20 DIAGNOSIS — F31.4 BIPOLAR DISORDER, CURRENT EPISODE DEPRESSED, SEVERE, WITHOUT PSYCHOTIC FEATURES (HCC): Primary | ICD-10-CM

## 2021-08-20 PROCEDURE — G0177 OPPS/PHP; TRAIN & EDUC SERV: HCPCS

## 2021-08-20 PROCEDURE — G0176 OPPS/PHP;ACTIVITY THERAPY: HCPCS

## 2021-08-20 PROCEDURE — G0410 GRP PSYCH PARTIAL HOSP 45-50: HCPCS

## 2021-08-20 NOTE — PSYCH
Subjective:     Patient ID: Joycelyn Chamberlain is a 51 y o  female  [facility] Clinical Progress Notes      Specialized Services Documentation  Therapist must complete separate progress note for each specific clinical activity in which the individual participated during the day  Allied Therapy   [id number] Chamberlain actively shared in [facility] group focused on social connection, rewiring unhealthy thoughts related to loneliness, and setting daily intentions related to how we are utilizing our coping skills  Joycelyn Conti Deputy engaged in group by sharing personal reflections, music listening, related to minor analysis and journaling during reflection time  Group explored practice of minor analysis, utilizing self-reflection questions to guide thought processes related to loneliness, and shared with the group during minor analysis  Joycelyn Varghese shared about feeling lonely on a daily basis regarding her mental health journey and that it really taught her who are true friends are  Group processed the perspective is it that they aren't a friend or that they are uncomfortable with the "unpretty or un-preferred" symptoms of mental health? April identified a sign of an unhealthy relationship during minor analysis  Some effort noted toward treatment goal   Continue AT to encourage the development and practice of monitering how we are utilizing our alone time and social connection time to  alleviate symptoms and support wellness       Treatment Plan Objectives Addressed: 1 1, 1 2, 1 4  Therapist:  ADAMA Guaman and GLADIS Valladares

## 2021-08-20 NOTE — PSYCH
Subjective:     Patient ID: Joycelyn Hanley is a 51 y o  female  [facility] Clinical Progress Notes      Specialized Services Documentation  Therapist must complete separate progress note for each specific clinical activity in which the individual participated during the day  Allied Therapy  [id number]--  Uziel Varghese participated in [facility] group focused on the use of music mindfulness and grounding strategies to regulate thoughts and emotions  Joycelyn Conti  engaged in  music listening, practicing techniques, as well as group discussion  Group explored practice of various mindfulness strategies with active music listening to explore how to ground themselves when experiencing intense emotions  Group utilized sensory mindfulness techniques (rainbow, 5 countdown technique, draw your breath, etc ), rhythmic grounding, and listening to music to identify emotions and the body sensations that occur  Some effort noted toward treatment goal  Continue AT to encourage the development and practice of utilizing mindfulness techniques to alleviate symptoms and support wellness  Tx Plan Objective: 1 1,  1 2, & 1 4      Therapist:  ADAMA [facility] [employer] Kapil Sharp [facility] Therapy   [id number] April Sushila Varghese actively shared in morning assessment and goal review  Presented as Receptive related to readiness to learn  Joycelyn Varghese did complete goal from last treatment day identifying gaining responsibility and support  April did not present with any barriers to learning  [id number] April Sushila Varghese engaged throughout the treatment day  Was engaged in learning related to Illness, Medication, Aftercare and Wellness Tools  Staff utilized Verbal, Written, A/V and Demonstration teaching methods    Joycelyn Conti  shared area of learning and set a goal for outside of program to continue gratitude journal       Tx Plan Objective: 1 1,1 2,1 4, Therapist:  NANCY Baez; Kapil Sharp MTI

## 2021-08-20 NOTE — PSYCH
Subjective:     Patient ID: Joycelyn Quevedo Ra is a 51 y o  female  [facility] Clinical Progress Notes      Specialized Services Documentation  Therapist must complete separate progress note for each specific clinical activity in which the individual participated during the day  GROUP PSYCHOTHERAPY ([id number])   The group engaged in the wellness assessment that evaluates progress on several different areas of wellness: physical, emotional, cognitive, vocational, social and spiritual  Clients rated their progress and discussed areas that need work  April shared her experiences with adjusting sleep hygiene and relating to other members' difficulty getting out of bed when depressed  April continues to make progress towards goals through participation in group activity  Continue with psychotherapy  TX Plan Objectives: 1 1, 1 2, 1 4  Therapist: Alejandro Roblero MA, [place]     Case Management Note    Alejandro Roblero MA, [place]    Current suicide risk : Low     A case management session is not scheduled today with Joycelyn Conti [place]; additionally, they did not request a CM meeting  Next scheduled session is 08/21/21  Medications changes/added/denied? No    Treatment session number: 5    Individual Case Management Visit provided today?  No    [facility]ations follow up physician's orders: None at this time

## 2021-08-20 NOTE — PSYCH
Subjective:     Patient ID: Joycelyn Tejeda is a 51 y o  female  [facility] Clinical Progress Notes      Specialized Services Documentation  Therapist must complete separate progress note for each specific clinical activity in which the individual participated during the day  Group Psychotherapy Life Skills  (9:30- 10:30) Joycelyn Conti  actively engaged in group focused on fun movement activities  The group shared fun memories from the past that involved movement activities that they have done and discussed the feelings associated with it  The group created a list of movement activities that they would like to try or do more of in order to relieve stress  April stated that they would like to go golfing  The group discussed the benefits that movement has on mental health  Group members listened to a short clip, Movement and Mental Health, which discussed habit stacking and the benefits movement  April continues to make progress towards goals through verbal participation in group; to accomplish long term goals continue to utilize skills learned in programming  Continue with psychotherapy to educate and encourage use of wellness tools   Tx Plan Objective: 1 1,1 2 Therapist: Meredith Ferrell

## 2021-08-20 NOTE — PSYCH
Subjective:     Patient ID: Joycelyn Velazco is a 51 y o  female  [facility] Clinical Progress Notes      Specialized Services Documentation  Therapist must complete separate progress note for each specific clinical activity in which the individual participated during the day  Group Psychotherapy Life Skills (9:30-10:30)  Group members discussed what mindfulness is to them and the benefits of mindfulness  Group members practiced several different mindfulness strategies and discussed how they felt  They discussed how and when they could practice mindfulness  April continues to make progress towards goals through verbal participation in group; to accomplish long term goals continue to utilize skills learned in programming  Continue with psychotherapy to educate and encourage use of wellness tools   Tx Plan Objective: 1 1,1 2 Therapist: Melissa Gomez

## 2021-08-23 ENCOUNTER — OFFICE VISIT (OUTPATIENT)
Dept: PSYCHOLOGY | Facility: CLINIC | Age: 46
End: 2021-08-23
Payer: COMMERCIAL

## 2021-08-23 DIAGNOSIS — F43.10 POST TRAUMATIC STRESS DISORDER (PTSD): ICD-10-CM

## 2021-08-23 DIAGNOSIS — F31.4 BIPOLAR DISORDER, CURRENT EPISODE DEPRESSED, SEVERE, WITHOUT PSYCHOTIC FEATURES (HCC): Primary | ICD-10-CM

## 2021-08-23 PROCEDURE — G0177 OPPS/PHP; TRAIN & EDUC SERV: HCPCS

## 2021-08-23 PROCEDURE — G0410 GRP PSYCH PARTIAL HOSP 45-50: HCPCS

## 2021-08-23 PROCEDURE — G0176 OPPS/PHP;ACTIVITY THERAPY: HCPCS

## 2021-08-23 NOTE — PSYCH
Subjective:     Patient ID: Joycelyn Slaughter is a 51 y o  female  [facility] Clinical Progress Notes      Specialized Services Documentation  Therapist must complete separate progress note for each specific clinical activity in which the individual participated during the day  Allied Therapy   [id number] Carlie Varghese  actively shared and participated in [facility] group focused on learning about what archetypes are, the purpose of survival archetypes,  the dark and light qualities of each archetype, and how each survival archetype relates to their life  Group focused on the victim and saboteur archetype today  Group explored archetypes through minor analysis, active discussion, and utilizing guided worksheets  Joycelyn Varghese  identified the light qualities of the victim within a minor analysis  Some effort noted toward treatment goal   Continue AT to encourage the development and practice of utilizing their guided worksheet of questions to help them move from the dark or shadow qualities of an archetype to the light qualities    Tx Plan Objective: 1 1, 1 2, & 1 4         Therapist:  ADAMA Mills

## 2021-08-23 NOTE — PSYCH
Subjective:     Patient ID: April Leigh Ann [place] is a 51 y o  female  [facility] Clinical Progress Notes      Specialized Services Documentation  Therapist must complete separate progress note for each specific clinical activity in which the individual participated during the day  GROUP PSYCHOTHERAPY ([id number]) The group engaged in open discussions about current stressors, challenges and asked questions about how to utilize topics discussed  Members were able to gain a different perspectives and deeper understanding of past experiences and current events  Group discussions and themes involved personal disclosures, boundary setting and understanding boundaries, perceptional baggage for self and others and positives from treatment  April shared understanding of perspectives and positive from the weekend  April continues to demonstrate insight and growth through verbal participation, offerings of support, encouragement and feedback to other group members during the open discussion time  Continue with psychotherapy  [place] Plan Objectives: 1 1, 1 2, 1 4   Therapist: Opal Manjarrez MA, [place]      Other ([id number]) Spoke with Capri Betzaida from [place] with a contact number [phone], using Tax ID [id number]  Location address remains [address] [place] [address]  April Sushila [place] was authorized 3 days from 08/24/21 to 08/25/21 with an authorization code of [id number]  Case Management Note    Dinesh Espinosa, [place]    Current suicide risk : Low     ([id number]) April stated that she got a psychiatrist appointment scheduled with Dr Tavo Mae on Wednesday  Will be excused from program to attend meeting  Discussed discharge plan  Will inform of concurrent review and discuss further  Medications changes/added/denied? No    Treatment session number: 6    Individual Case Management Visit provided today? Yes     [facility] follow up physician's orders: None at this time

## 2021-08-23 NOTE — PSYCH
Subjective:     Patient ID: Joycelyn Moss is a 51 y o  female  [facility] Clinical Progress Notes      Specialized Services Documentation  Therapist must complete separate progress note for each specific clinical activity in which the individual participated during the day  Group Psychotherapy  [id number] April Sushila Varghese actively shared in psychotherapy group focused on mindfulness strategies - DBT How skills  Group introduced to and practiced the [facility] skills non-judgmental, one-mindfully, and effectiveness through various tasks  April contributed often during group and asked questions when appropriate  Group discussed ways to apply to slowing down to make more effective choices  April identified they could practice mindfulness tonight through golfing  Good effort noted toward treatment goal   Continue psychotherapy to encourage the development and practice of mindfulness strategies to alleviate symptoms and support wellness  Tx Plan Objective: 1 1,1 2,1 4, Therapist: Noemy Taylor MT-BC: GLADIS Saunders    Education Therapy   [phone] Joycelyn Conti  actively shared in morning assessment and goal review  Presented as Receptive related to readiness to learn  Joycelyn Conti [place] did not complete goal from last treatment day but was able to gain support from extended family  April did not present with any barriers to learning  [phone] April Sushila Varghese engaged throughout the treatment day  Was engaged in learning related to Illness, Medication, Aftercare and Wellness Tools  Staff utilized Verbal, Written, A/V and Demonstration teaching methods    April Sushila Varghese shared area of learning and set a goal for outside of program to write in gratitude journal       Tx Plan Objective: 1 1,1 2,1 4, Therapist:  Claudia Toribio MA, [place]; GLADIS Saunders

## 2021-08-24 ENCOUNTER — OFFICE VISIT (OUTPATIENT)
Dept: PSYCHOLOGY | Facility: CLINIC | Age: 46
End: 2021-08-24
Payer: COMMERCIAL

## 2021-08-24 DIAGNOSIS — F43.10 POST TRAUMATIC STRESS DISORDER (PTSD): ICD-10-CM

## 2021-08-24 DIAGNOSIS — F31.4 BIPOLAR DISORDER, CURRENT EPISODE DEPRESSED, SEVERE, WITHOUT PSYCHOTIC FEATURES (HCC): Primary | ICD-10-CM

## 2021-08-24 PROCEDURE — G0410 GRP PSYCH PARTIAL HOSP 45-50: HCPCS

## 2021-08-24 PROCEDURE — G0177 OPPS/PHP; TRAIN & EDUC SERV: HCPCS

## 2021-08-24 NOTE — PSYCH
Subjective:     Patient ID: Joycelyn Sharpe is a 51 y o  female  [facility] Clinical Progress Notes      Specialized Services Documentation  Therapist must complete separate progress note for each specific clinical activity in which the individual participated during the day  GROUP PSYCHOTHERAPY ([id number]) The group completed a sleep activity that reviewed sleeping soundly suggestions  They explored positive sleep hygiene tips and addressed what to do if they cant sleep or sleep is interrupted  Members received multiple sleep logs to begin to explore and track their sleep patters and identify where they could incorporate the healthy tips  Members were educated on resources for meditations and stretches to implement into their sleep routine to release tension and sleep easier  Joycelyn Varghese continues to make progress towards goals through verbal and activity participation in group  Continue with psychotherapy  TX Plan Objectives: 1 1, 1 2, 1 4   Therapist: Faina Watters MA, [place]       Case Management Note    Faina Watters MA, [place]    Current suicide risk : Low     ([phone]) CM reviewed insurance concurrent authorization  Discussed discharge or IOP step down  April would like the IOP step down if authorized  Discussed and reviewed treatment plan  April will be excused from the next treatment day to attend outpatient psychiatrist appointment  Medications changes/added/denied? No    Treatment session number: 7    Individual Case Management Visit provided today? Yes     [facility]ations follow up physician's orders: None at this time

## 2021-08-24 NOTE — PSYCH
Subjective:     Patient ID: Joycelyn Trent is a 51 y o  female  [facility] Clinical Progress Notes      Specialized Services Documentation  Therapist must complete separate progress note for each specific clinical activity in which the individual participated during the day  Group Psychotherapy ([id number]) April was engaged in a group that started out with Progressive Muscle Relaxation  After our morning Mindfulness exercise, we then lead into a group that was guided by strengths discussion questions  April was involved and participated throughout the discussion question portion of the group  April will continue with life skills and psychotherapy groups  Good progress made towards treatment   Tx Plan Objective: 1 1,1 2 Therapist:  Ac Melendez BS

## 2021-08-24 NOTE — PSYCH
Assessment/Plan:       Diagnoses and all orders for this visit:     Bipolar disorder, current episode depressed, severe, without psychotic features [facility])     Post traumatic stress disorder (PTSD)     Subjective:      Patient ID: Joycelyn Moss is a 51 y o  female  [facility] Treatment Plan   AREAS OF NEED: Bipolar disorder and PTSD as evidenced by increased anxiety, depression, suicidal attempts on medications and isolation due to limited supports and separation from  and traumatic history  Date Initiated: 08/12/21     Strengths: "smart, kind, hard working, determined and resilient"         LONG TERM GOAL:   Date Initiated: 08/12/21  1 0 I will identify 3 ways my suicidal ideation and pseudoseizures have subsided  Target Date: 09/09/21  Completion Date:         SHORT TERM OBJECTIVES:      Date Initiated: 08/12/21  1 1 I will learn 3 coping skills to decrease my anxiousness and depressive symptoms  Revision Date: 08/24/21   Target Date: 08/24/21  Completion Date:      Date Initiated: 08/12/21  1 2 I will identify 3 ways that I have attempted reconnections with my children  Revision Date: 08/24/21   Target Date: 08/24/21  Completion Date:     Date Initiated: 08/12/21  1 3 I will take medications as prescribed and share questions and concerns if arise  Revision Date: 08/24/21   Target Date: 08/24/21  Completion Date:      Date Initiated: 08/12/21  1 4 I will identify 3 ways my supports can assist in my recovery and agree to staff/support contact as indicated  Revision Date: 08/24/21   Target Date: 08/24/21  Completion Date:            7 DAY REVISION:  Date Initiated: 08/24/21   1 5 I will brainstorm and create a physical coping skills to release energy other than removing myself and going for a walk  Revision Date:   Target Date: 09/03/21  Completion Date:    Date Initiated: 08/24/21   1 6 I will complete 1 page a day of my [facility] [employer]  [facility] to prepare for discharge     Revision Date:   Target Date: 09/03/21  Completion Date:        PSYCHIATRY:  Date Initiated:  08/12/21  Medication Management and Education       Revision Date: 08/24/21   1 3 Continue medication management       The person(s) responsible for carrying out the plan is Brandon Ogden MD     NURSING/SYMPTOM EDUCATION:  Date Initiated: 08/12/21       1 1, 1 2  1 3, 1 4 Provide wellness/symptoms and skill education groups three to five days weekly to educate Viola Varghese on signs and symptoms of diagnoses, skills to manage stressors, and medication questions that will be addressed by the treatment team         Revision date: 08/24/21   1 1,1 2,1 3,1 4,1 5 Continue to encourage Joycelyn Varghese to participate in wellness groups daily to learn about symptoms, coping strategies and warning signs to promote relapse prevention  The person(s) responsible for carrying out the plan is NANCY Sampson, OPALW and [facility]     PSYCHOLOGY:   Date Initiated: 08/12/21       1 1, 1 2, 1 4 Provide psychotherapy group 5 times per week to allow opportunity for Joycelyn Varghese  to explore stressors and ways of coping  Revision Date: 08/24/21   1 1,1 2,1 4,1 5  Continue to provide psychotherapy group daily to Joycelyn Varghese and encourage sharing of stressors, skills and positive change  The person(s) responsible for carrying out the plan is Dinesh Hernandez, [facility]     ALLIED THERAPY:   Date Initiated: 08/12/21  1 1,1 2 Engage Joycelyn Varghese in AT group 5 times daily to encourage development and use of wellness tools to decrease symptoms and promote recovery through meaningful activity  Revision Date: 08/24/21   1 1,1 2,1 5 Continue to engage Joycelyn Varghese to participate in AT group to practice wellness tools within program and transfer to home sharing successes and barriers through healthy task involvement         The person(s) responsible for carrying out the plan is ADAMA Card and Rosario MT-BC     CASE MANAGEMENT:   Date Initiated: 08/12/21      1 0 This  will meet with April Sushila Varghese  3-4 times weekly to assess treatment progress, discharge planning, connection to community supports and UR as indicated  Revision Date: 08/24/21   1 0 Continue to meet with April Sushila Varghese 3-4 times weekly to assess growth, work toward goals, continued treatment needs, dc planning and use of supports  The person(s) responsible for carrying out the plan is Demetra Tejeda MA, [place]     TREATMENT REVIEW/COMMENTS:      DISCHARGE CRITERIA: Identify 3 signs of progress and complete relapse prevention plan  DISCHARGE PLAN: Connect with identified outpatient providers     Estimated Length of Stay: 10 treatment days       Diagnosis and Treatment Plan explained to April, April relates understanding diagnosis and is agreeable to Treatment Plan             CLIENT COMMENTS / Please share your thoughts, feelings, need and/or experiences regarding your treatment plan: _____________________________________________________________________________________________________________________________________________________________________________________________________________________________________________________________________________________________________________________ Date/Time: ______________      Patient Signature: _________________________________      Date/Time: ______________       Signature: _________________________________     Date/Time: ______________

## 2021-08-25 ENCOUNTER — DOCUMENTATION (OUTPATIENT)
Dept: PSYCHOLOGY | Facility: CLINIC | Age: 46
End: 2021-08-25

## 2021-08-25 ENCOUNTER — APPOINTMENT (OUTPATIENT)
Dept: PSYCHOLOGY | Facility: CLINIC | Age: 46
End: 2021-08-25
Payer: COMMERCIAL

## 2021-08-25 NOTE — PROGRESS NOTES
Subjective:     Patient ID: April Dangelo Nelson is a 51 y o  female  [facility] Clinical Progress Notes      Specialized Services Documentation  Therapist must complete separate progress note for each specific clinical activity in which the individual participated during the day  Case Management Note    Osorio Morton MA, [place]    Current suicide risk : Unable to assess due to absence  April Sushila Varghese was excused from program today 08/25/21 for attending psychiatric intake  ([id number]) April called to ask about extending FLMA  Will attend program tomorrow discuss further after consult with doctor  Medications changes/added/denied? No    Treatment session number: N/A    Individual Case Management Visit provided today? No    [facility]ations follow up physician's orders: None at this time

## 2021-08-26 ENCOUNTER — OFFICE VISIT (OUTPATIENT)
Dept: PSYCHOLOGY | Facility: CLINIC | Age: 46
End: 2021-08-26
Payer: COMMERCIAL

## 2021-08-26 DIAGNOSIS — F43.10 POST TRAUMATIC STRESS DISORDER (PTSD): ICD-10-CM

## 2021-08-26 DIAGNOSIS — F31.4 BIPOLAR DISORDER, CURRENT EPISODE DEPRESSED, SEVERE, WITHOUT PSYCHOTIC FEATURES (HCC): Primary | ICD-10-CM

## 2021-08-26 PROCEDURE — G0176 OPPS/PHP;ACTIVITY THERAPY: HCPCS

## 2021-08-26 PROCEDURE — G0177 OPPS/PHP; TRAIN & EDUC SERV: HCPCS

## 2021-08-26 PROCEDURE — G0410 GRP PSYCH PARTIAL HOSP 45-50: HCPCS

## 2021-08-26 NOTE — PSYCH
Subjective:     Patient ID: Joycelyn Raines is a 51 y o  female  [facility] Clinical Progress Notes      Specialized Services Documentation  Therapist must complete separate progress note for each specific clinical activity in which the individual participated during the day  Allied Therapy    [id number]--  Joycelyn Varghese actively shared in [facility] group focused on analyzing personal self-destructive behaviors within certain situations and creating their unique crisis coping skill plan  Joycelyn Varghese engaged in active discussion  Group explored practice of minor analysis, active discussion, and utilizing DBT worksheets to analyze behaviors  April identified that making sure you have a list of professionals numbers and crisis lines is super important to have as you never know when a severe crisis situation could occur  Some effort noted toward treatment goal   Continue AT to encourage the development and practice of utilizing their unique crisis coping skill plan to alleviate symptoms and support wellness    Tx Plan Objective: 1 1, Therapist:  ADAMA Sutton

## 2021-08-26 NOTE — PSYCH
Subjective:     Patient ID: April Patty Lorenzana is a 51 y o  female  [facility] Clinical Progress Notes      Specialized Services Documentation  Therapist must complete separate progress note for each specific clinical activity in which the individual participated during the day  Case Management Note    Dinesh Reyes, [facility]    Current suicide risk : Low     ([id number]) Reviewed return to work plan and letter  Reviewed extension of insurance coverage and addressed IOP step down  Reviewed and signed treatment plan  April will look to see if there a return to work form specifically for her job  Medications changes/added/denied? No    Treatment session number: 8    Individual Case Management Visit provided today? Yes     In[facility]ations follow up physician's orders: None at this time

## 2021-08-26 NOTE — PSYCH
Subjective:     Patient ID: Joycelyn Trent is a 51 y o  female  [facility] Clinical Progress Notes      Specialized Services Documentation  Therapist must complete separate progress note for each specific clinical activity in which the individual participated during the day  Group Psychotherapy ([id number]) April actively shared in psychotherapy group focused on Cognitive Distortions with also participating in our Progressive Muscle Relaxation exercise to finish the group  April was also involved in an open-discussion with how we can start to untwist our cognitive distortions and collect all the facts to a situation  April will continue with life skills and psychotherapy groups  Some progress made towards treatment   Tx Plan Objective: 1 1,1 2 Therapist:  VANESSA Hoff

## 2021-08-26 NOTE — PSYCH
Subjective:     Patient ID: Joycelyn Vaughan is a 51 y o  female  [facility] Clinical Progress Notes      Specialized Services Documentation  Therapist must complete separate progress note for each specific clinical activity in which the individual participated during the day  Group Psychotherapy   [id number]  Joycelyn Conti [place] actively shared in psychotherapy group exploring DBT distress tolerance crisis survival strategies  Group explored crisis survival strategies (ACCEPTS, SELF-SOOTHING, TIP, STOP, IMPROVE and PROS & CONS) reinforcing actions one could take to learn to tolerate stressful experiences, thoughts and urges  Group engaged in therapist led imagery experience  April contributed to group often and took notes  April felt she could put effort into practicing STOP  Good progress toward goal noted  Continue group to encourage learning, practice and home practice of skills to manage distress  Tx Plan Objective: 1 1,1 2,1 4, Therapist:  Sarah GALAN; GLADIS Whaley      Education Therapy   [id number] Joycelyn Conti [place] actively shared in morning assessment and goal review  Presented as Receptive related to readiness to learn  Mandie Castaneday did complete goal from last treatment day identifying gaining support  April did not present with any barriers to learning  [phone] Joycelyn Conti  engaged throughout the treatment day  Was engaged in learning related to Illness, Medication, Aftercare and Wellness Tools  Staff utilized Verbal, Written, A/V and Demonstration teaching methods    Joycelyn Conti [place] shared area of learning and set a goal for outside of program to write in her gratitude journal       Tx Plan Objective: 1 1,1 2,1 4, Therapist:  VANESSA Hernandez; GLADIS Whaley

## 2021-08-27 ENCOUNTER — OFFICE VISIT (OUTPATIENT)
Dept: PSYCHOLOGY | Facility: CLINIC | Age: 46
End: 2021-08-27
Payer: COMMERCIAL

## 2021-08-27 DIAGNOSIS — F43.10 POST TRAUMATIC STRESS DISORDER (PTSD): ICD-10-CM

## 2021-08-27 DIAGNOSIS — F31.4 BIPOLAR DISORDER, CURRENT EPISODE DEPRESSED, SEVERE, WITHOUT PSYCHOTIC FEATURES (HCC): Primary | ICD-10-CM

## 2021-08-27 PROCEDURE — G0410 GRP PSYCH PARTIAL HOSP 45-50: HCPCS

## 2021-08-27 PROCEDURE — G0176 OPPS/PHP;ACTIVITY THERAPY: HCPCS

## 2021-08-27 PROCEDURE — G0177 OPPS/PHP; TRAIN & EDUC SERV: HCPCS

## 2021-08-27 NOTE — PSYCH
Subjective:     Patient ID: April Caryn Doll is a 51 y o  female  [facility] Clinical Progress Notes      Specialized Services Documentation  Therapist must complete separate progress note for each specific clinical activity in which the individual participated during the day  Group Psychotherapy ([id number]) April participated in a group that started with a mindfulness technique of progressive muscle relaxation  After finishing our mindfulness exercise April participated in an open discussion card game called self-care empowerment  Each card would express or impose a question or way to empower an area of their life  April will continue with life skills and psychotherapy groups  Good progress made towards treatment    Tx Plan Objective: 1 1,1 2 Therapist:  VANESSA Sanches

## 2021-08-27 NOTE — PSYCH
Subjective:     Patient ID: Joycelyn Echavarria is a 51 y o  female  [facility] Clinical Progress Notes      Specialized Services Documentation  Therapist must complete separate progress note for each specific clinical activity in which the individual participated during the day  GROUP PSYCHOTHERAPY ([id number]) The group engaged in the wellness assessment, which evaluates progress on several different areas of wellness/wellbeing: physical, emotional, cognitive, vocational, social and spiritual  Clients rated their progress and discussed areas that need work  By completing and discussing areas of progress and challenges, members are connected and reminded that, in their mental health struggle, they are not alone  Topics of discussion revolved around gratefulness, forgiveness and delegating effectively  April shared her experiences with gratefulness and forgiveness  April continues to make progress towards goals through participation in group activity and personal disclosures  Continue with psychotherapy  TX Plan Objectives: 1 1, 1 2, 1 4  Therapist: Erasto Stephens MA, [place]       Other ([id number]) Spoke with Lizbet from [place] with a contact number [phone], using Tax ID [id number]  Location address remains [address] [place] [address]   Joycelyn Conti  was authorized 12 [facility] days from 08/30/21 to 09/24/21 with an authorization code of [id number]  Case Management Note    Erasto Stephens, [place]    Current suicide risk : Low     ([id number]) Reviewed with April the [facility] authorization  She stated that she continues to wait to hear from her work about a return to work letter/form  Medications changes/added/denied? No    Treatment session number: 9    Individual Case Management Visit provided today? Yes     Innovations follow up physician's orders: None at this time

## 2021-08-27 NOTE — PSYCH
Subjective:     Patient ID: Joycelyn Callahan is a 51 y o  female  [facility] Clinical Progress Notes      Specialized Services Documentation  Therapist must complete separate progress note for each specific clinical activity in which the individual participated during the day  Allied Therapy   [id number] Joycelyn Varghese shared in [facility] group that discussed leisure skills  The group identified current leisure skills as well as learned how to determine between healthy and unhealthy leisure skills  The group engaged in a song discussion as well as a fill in the blank song writing exercise  April offered many healthy leisure skills to the group  April discussed the difference between healthy and unhealthy leisure skills and will practice reading as a healthy leisure skill over the weekend  Good progress was made toward treatment goal  Continue AT to encourage development and practice of leisure skills  Tx Plan Objective: 1 1,1 2,1 4, Therapist:  ADAMA Hamm; GLADIS Hylton    Education Therapy   [phone] Joycelyn Varghese actively shared in morning assessment and goal review  Presented as Receptive related to readiness to learn  Tamara Varghese did complete goal from last treatment day identifying gaining support  April did not present with any barriers to learning  [phone] Joycelyn Varghese engaged throughout the treatment day  Was engaged in learning related to Illness, Medication, Aftercare and Wellness Tools  Staff utilized Verbal, Written, A/V and Demonstration teaching methods  Joycelyn Varghese shared area of learning and set a goal for outside of program to keep busy and read        Tx Plan Objective: 1 1,1 2,1 4, Therapist:  ADAMA Hamm; GLADIS Hylton

## 2021-08-30 ENCOUNTER — OFFICE VISIT (OUTPATIENT)
Dept: PSYCHOLOGY | Facility: CLINIC | Age: 46
End: 2021-08-30
Payer: COMMERCIAL

## 2021-08-30 DIAGNOSIS — F43.10 POST TRAUMATIC STRESS DISORDER (PTSD): ICD-10-CM

## 2021-08-30 DIAGNOSIS — F31.4 BIPOLAR DISORDER, CURRENT EPISODE DEPRESSED, SEVERE, WITHOUT PSYCHOTIC FEATURES (HCC): Primary | ICD-10-CM

## 2021-08-30 PROCEDURE — S9480 INTENSIVE OUTPATIENT PSYCHIA: HCPCS

## 2021-08-30 NOTE — PSYCH
Subjective:     Patient ID: Joycelyn Lantigua is a 51 y o  female  [facility] Clinical Progress Notes      Specialized Services Documentation  Therapist must complete separate progress note for each specific clinical activity in which the individual participated during the day  Group Psychotherapy ([id number]) April was engaged in a psychoeducation group focused on setting appropriate boundaries to improve overall wellness and to achieve more internal happiness  A brief lynnette talk speaker started the group followed by a personal boundary worksheet  Group discussed different types of boundaries as followed: Porous Boundaries, Healthy Boundaries, and Rigid Boundaries  Group then engaged in open discussion on areas were they can improve boundaries and also apply mindfulness while communicating their new set of boundaries to their loved ones or friends  April will continue with life skills and psychotherapy groups  Good progress made towards treatment    Tx Plan Objective: 1 1,1 2 Therapist:  VANESSA Carrington

## 2021-08-30 NOTE — PSYCH
Subjective:     Patient ID: April Mendel Coventry is a 51 y o  female  [facility] Clinical Progress Notes      Specialized Services Documentation  Therapist must complete separate progress note for each specific clinical activity in which the individual participated during the day  Allied Therapy   [id number]- April Sushila Triplettuty actively shared in [facility] group focused on using ACT metaphors and visualizations to decrease the intensity of anxiety and negative thoughts  Joycelyn Conti [place] engaged in listening to music, processing, and practicing visualization techniques  Group explored practice of utilizing 3 visualization techniques as well as analyzing what they viewed as effective and how they would implement the practice in the outside world  Some effort noted toward treatment goal   Continue AT to encourage the development and practice of ACT visualization strategies to alleviate symptoms and support wellness    Tx Plan Objective: 1 1, 1 2, & 1 4        Therapist: ADAMA Horne

## 2021-08-30 NOTE — PSYCH
Subjective:     Patient ID: Joycelyn Quevedo Ra is a 51 y o  female  [facility] Clinical Progress Notes      Specialized Services Documentation  Therapist must complete separate progress note for each specific clinical activity in which the individual participated during the day  Education Therapy   [id number] Connie Varghese actively shared in morning assessment and goal review  Presented as Receptive related to readiness to learn  Joycelyn Varghese did complete goal from last treatment day identifying gaining responsibility and support  April did not present with any barriers to learning  [phone] Joycelyn Varghese engaged throughout the treatment day  Was engaged in learning related to Illness, Medication, Aftercare and Wellness Tools  Staff utilized Verbal, Written, A/V and Demonstration teaching methods  Joycelyn Varghese shared area of learning and set a goal for outside of program to write in her gratitude journal and to work on advanced directive        Tx Plan Objective: 1 1,1 2,1 4, Therapist:  Alejandro Roblero MA, GLADIS Jc

## 2021-08-30 NOTE — PSYCH
Subjective:     Patient ID: April Mendel Coventry is a 51 y o  female  [facility] Clinical Progress Notes      Specialized Services Documentation  Therapist must complete separate progress note for each specific clinical activity in which the individual participated during the day  GROUP PSYCHOTHERAPY ([id number]) The group engaged in open discussions about current stressors, challenges and asked questions about how to utilize topics discussed  Members were able to gain a different perspectives and deeper understanding of past experiences and current events  Group discussions and themes involved personal disclosures, description of anxiety experiences, understanding of grounding and coping skills, when/how to practice skills, how gratitude can be a wellness tool and how acceptance can be utilized to decrease symptoms  April shared her utilization of specific grounding skills and encouraged recognition of thoughts and their validity  April continues to demonstrate insight and growth through verbal participation, offerings of support, encouragement and feedback to other group members during the open discussion time  Continue with psychotherapy  TX Plan Objectives: 1 1, 1 2, 1 4   Therapist: Pauly Banks MA, [place]      Case Management Note    Pauly Banks MA, [place]    Current suicide risk : Low     (3971-3179) Talked about IOP days and medication appointments  Will be off tomorrow; in program Wednesday and Thursday  Stated following weeks appointments  Medications changes/added/denied? No    Treatment session number: 10 IOP 1    Individual Case Management Visit provided today?  Yes     [facility] follow up physician's orders: None at this time

## 2021-08-30 NOTE — PSYCH
[facility] Clinical Progress Notes      Specialized Services Documentation  Therapist must complete separate progress note for each specific clinical activity in which the individual participated during the day         [facility] follow up physician's orders:   DATE 8/30/2021  TIME 9:59 AM   [facility] TODAY  Coco Medina MD

## 2021-08-31 ENCOUNTER — APPOINTMENT (OUTPATIENT)
Dept: PSYCHOLOGY | Facility: CLINIC | Age: 46
End: 2021-08-31
Payer: COMMERCIAL

## 2021-09-01 ENCOUNTER — OFFICE VISIT (OUTPATIENT)
Dept: PSYCHIATRY | Facility: CLINIC | Age: 46
End: 2021-09-01
Payer: COMMERCIAL

## 2021-09-01 ENCOUNTER — OFFICE VISIT (OUTPATIENT)
Dept: PSYCHOLOGY | Facility: CLINIC | Age: 46
End: 2021-09-01
Payer: COMMERCIAL

## 2021-09-01 DIAGNOSIS — F31.75 BIPOLAR DISORDER, IN PARTIAL REMISSION, MOST RECENT EPISODE DEPRESSED (HCC): Primary | ICD-10-CM

## 2021-09-01 DIAGNOSIS — F31.4 BIPOLAR DISORDER, CURRENT EPISODE DEPRESSED, SEVERE, WITHOUT PSYCHOTIC FEATURES (HCC): Primary | ICD-10-CM

## 2021-09-01 DIAGNOSIS — F43.10 POST TRAUMATIC STRESS DISORDER (PTSD): ICD-10-CM

## 2021-09-01 DIAGNOSIS — Z51.81 ENCOUNTER FOR THERAPEUTIC DRUG MONITORING: ICD-10-CM

## 2021-09-01 PROCEDURE — 99214 OFFICE O/P EST MOD 30 MIN: CPT | Performed by: NURSE PRACTITIONER

## 2021-09-01 PROCEDURE — S9480 INTENSIVE OUTPATIENT PSYCHIA: HCPCS

## 2021-09-01 NOTE — PSYCH
Subjective:     Patient ID: Joycelyn Brown is a 51 y o  female  [facility] Clinical Progress Notes      Specialized Services Documentation  Therapist must complete separate progress note for each specific clinical activity in which the individual participated during the day  Allied Therapy [id number] April Sushila Varghese actively shared in [facility] group focused on distress tolerance skill self-soothe  April was observed to be engaged in therapist led visualization  Group discussed specific items that could help self soothe if in an anxiety crisis with encouragement to use these things regularly to manage stressors consistently  April identified she would put lotion and slipper socks  in  a self soothe kit  Good effort noted toward tx goal   Continue AT to encourage development of wellness skills and consistent practice  Tx Plan Objective: 1 1,1 2,1 4, Therapist:  Cynthia GALAN; GLADIS Torres    Education Therapy   [id number] April Sushila Varghese actively shared in morning assessment and goal review  Presented as Receptive related to readiness to learn  April Sushila Varghese did complete goal from last treatment day identifying gaining responsibility and hope  April did not present with any barriers to learning  [phone] Joycelyn Varghese engaged throughout the treatment day  Was engaged in learning related to Illness, Medication, Aftercare and Wellness Tools  Staff utilized Verbal, Written, A/V and Demonstration teaching methods  Joycelyn Varghese shared area of learning and set a goal for outside of program to call short term disability about long term disability         Tx Plan Objective: 1 1,1 2,1 4, Therapist:  ADAMA Basurto; GLADIS Torres

## 2021-09-01 NOTE — PSYCH
Subjective:     Patient ID: Joycelyn Ng is a 51 y o  female  [facility] Clinical Progress Notes      Specialized Services Documentation  Therapist must complete separate progress note for each specific clinical activity in which the individual participated during the day  Group Psychotherapy ([id number]) April was engaged in a DBT House of Treatment activity  Where the house identifies different areas of treatment starting from the foundation where April labeled their values that guide their life  Next, along the outside of their house each group member wrote down anyone who supports them, following with writing down on the roof things or people that protect them  Second, each group member wrote down on their door things you keep hidden from others  Third, on the chimney they were asked to write down ways in which you blow off steam   Fourth, they made billboard sign in their yard and wrote things that they are proud of and or wanted others to see  Inside each group members home, they were asked to draw four different levels:  Level 1: Things or behaviors you are trying to gain control over, or areas of your life you want to change  Level 2: Write or draw things that you want to experience more often or just in a healthier way  Level 3: Draw or write things you feel happy about or would like to even feel happier about  Level 4: On this layer you would write down or draw things to resemble what a, Life [place] looks like for you  After the houses were finished the group opened and shared areas that were difficult when creating their house, areas they had strengths in, and areas that they want to work on  April will continue with life skills and psychotherapy groups  Good progress made towards treatment   Tx Plan Objective: 1 1,1 2 Therapist:  VANESSA Leone

## 2021-09-01 NOTE — PSYCH
Subjective:     Patient ID: Joycelyn Saucedo is a 51 y o  female  [facility] Clinical Progress Notes      Specialized Services Documentation  Therapist must complete separate progress note for each specific clinical activity in which the individual participated during the day  GROUP PSYCHOTHERAPY (2729-0904) The group watched a video of Sara Haynes and how to make an impact in our own lives and be grateful for the things we have  Clients participated actively in by discussing take away from video clip and challenges they may be facing and how being grateful and mindful can change the narrative of the circumstance  Engaged in strength identifying activity to initiate self confidence to make an impact  April shared take away from video and expressed her insight into personal strengths  Joycelyn Varghese continues to make progress towards goals through verbal participation in group  Continue with psychotherapy  TX Plan Objectives: 1 1, 1 2, 1 4   Therapist: Sunita Gonzalez MA, [place]        Case Management Note    Dinesh Bañuelos [place]    Current suicide risk : Low     ([id number]) April discussed her need for a return to work letter at time of discharge  Reviewed treatment and discharge date of 09/16/21  She shared her experiences and understanding that, even though she's not feeling overwhelming great, she recognizes that she is utilizing more coping skills than she had been before  Medications changes/added/denied? No - See Guillermo's note - requested lab work  Treatment session number: 11 IOP 2    Individual Case Management Visit provided today?  Yes     [facility] follow up physician's orders: Complete lab work requested on 09/01/21 by Shabana Martinez

## 2021-09-01 NOTE — PSYCH
PHP MEDICATION MANAGEMENT NOTE        [address]    Name and Date of Birth:  Joycelyn Varghese 51 y o  1975 MRN: [id number]    Date of Visit: September 1, 2021    Allergies   Allergen Reactions    Amoxicillin Hives and Rash     SUBJECTIVE:    April is seen today for a follow up for Bipolar Disorder and PTSD  She reports that she has improved since beginning PHP  Patient is feeling more stable overall  Has learned helpful coping mechanisms and feels that medication changes have improved mood and decreased depression  Tremors significantly improved with addition of Cogentin  Continues to experience breast discharge and amenorrhea  Will check labs including prolactin level  Patient will consider consulting her [facility] breast specialist if prolactin comes back WNL  PLAN:    Continue current psychiatric medications as ordered  Aware of 24 hour and weekend coverage for urgent situations accessed by calling [facility] main practice number  Continue partial hospitalization program    Diagnoses and all orders for this visit:    Bipolar disorder, in partial remission, most recent episode depressed ([facility] [address] )  -     Comprehensive metabolic panel; Future  -     CBC and differential; Future  -     Prolactin; Future  -     Lithium level; Future  -     Lipid Panel with Direct LDL reflex; Future    Encounter for therapeutic drug monitoring  -     Comprehensive metabolic panel; Future  -     CBC and differential; Future  -     Prolactin; Future  -     Lithium level; Future  -     Lipid Panel with Direct LDL reflex;  Future        Current Outpatient Medications on File Prior to Visit   Medication Sig Dispense Refill    Advair Diskus 500-50 MCG/DOSE inhaler Inhale 1 puff 2 (two) times a day      amLODIPine (NORVASC) 10 mg tablet Take 10 mg by mouth daily      benztropine (COGENTIN) 0 5 mg tablet Take 1 tablet (0 5 mg total) by mouth 2 (two) times a day 60 tablet 2    Cholecalciferol 25 MCG (1000 UT) tablet Take 1,000 Units by mouth      clindamycin (CLEOCIN T) 1 % lotion Apply 1 application topically 2 (two) times a day      famotidine (PEPCID) 20 mg tablet Take 20 mg by mouth daily at bedtime      ferrous gluconate (FERGON) 240 (27 FE) MG tablet Take 27 mg by mouth daily      fluticasone (FLONASE) 50 mcg/act nasal spray 1 spray into each nostril daily      gabapentin (NEURONTIN) 600 MG tablet Take 600 mg by mouth daily at bedtime      lithium 600 MG capsule Take 600 mg by mouth 2 (two) times a day      lithium carbonate 300 mg capsule Take 300 mg by mouth daily at bedtime      montelukast (SINGULAIR) 10 mg tablet Take 10 mg by mouth daily at bedtime      pantoprazole (PROTONIX) 40 mg tablet Take 40 mg by mouth daily      prazosin (MINIPRESS) 2 mg capsule Take 4 mg by mouth daily at bedtime      spironolactone (ALDACTONE) 25 mg tablet Take 25 mg by mouth daily      Vraylar 1 5 MG capsule Take 1 5 mg by mouth daily       No current facility-administered medications on file prior to visit  Psychotherapy Provided:     Individual psychotherapy provided: Yes  Counseling was provided during the session today for 16 minutes  Supportive counseling provided  HPI ROS Appetite Changes and Sleep:     She reports difficulty falling asleep, adequate appetite, adequate energy level   Decreased frequency intermittent passive SI without plan or intent    Review Of Systems:      General emotional problems, decreased functioning, sleep disturbances and marital problems   Personality no change in personality   Constitutional negative   ENT negative   Cardiovascular negative   Respiratory negative   Gastrointestinal negative   Genitourinary negative   Musculoskeletal negative   Integumentary negative   Neurological negative   Endocrine negative   Other Symptoms none, all other systems are negative     Mental Status Evaluation:    Appearance Adequate hygiene and grooming   Behavior calm and cooperative   Mood depressed  Depression Scale -  of 10 (0 = No depression)  Anxiety Scale -  of 10 (0 = No anxiety)   Speech Normal rate and volume   Affect appropriate and mood-congruent   Thought Processes Goal directed and coherent   Thought Content Does not verbalize delusional material   Associations Tightly connected   Perceptual Disturbances Denies hallucinations and does not appear to be responding to internal stimuli   Risk Potential Suicidal/Homicidal Ideation - Suicidal Ideations without plan  Risk of Violence - No evidence of risk for violence found on assessment  Risk of Self Mutilation - No evidence of risk for self mutilation found on assessment   Orientation oriented to person, place, time/date and situation   Memory recent and remote memory grossly intact   Consciousness alert and awake   Attention/Concentration attention span and concentration are age appropriate   Insight intact   Judgement intact   Muscle Strength and Gait normal muscle strength and normal muscle tone, normal gait/station and normal balance   Motor Activity no abnormal movements   Language no difficulty naming common objects, no difficulty repeating a phrase, no difficulty writing a sentence   Fund of Knowledge adequate knowledge of current events  adequate fund of knowledge regarding past history  adequate fund of knowledge regarding vocabulary      Past Psychiatric History Update:     Inpatient Psychiatric Admission Since Last Encounter:   no  Suicide Attempt Or Self Mutilation Since Last Encounter:   no  Incidence of Violent Behavior Since Last Encounter:   no    Traumatic History Update:     New Onset of Abuse Since Last Encounter:   no  Traumatic Events Since Last Encounter:   no    Past Medical History:    Past Medical History:   Diagnosis Date    Head injury      Past Medical History Pertinent Negatives:   Diagnosis Date Noted    Seizures ([facility] [address] ) 08/12/2021     Past Surgical History:   Procedure Laterality Date    BACK SURGERY       SECTION      CYST REMOVAL       Allergies   Allergen Reactions    Amoxicillin Hives and Rash     Substance Abuse History:    Social History     Substance and Sexual Activity   Alcohol Use Not Currently    Comment: 2021     Social History     Substance and Sexual Activity   Drug Use Not Currently     Social History:    Social History     Socioeconomic History    Marital status: /Civil Union     Spouse name: Not on file    Number of children: 3    Years of education: Not on file    Highest education level: Bachelor's degree (e g , BA, AB, BS)   Occupational History    Occupation: short term disability   Tobacco Use    Smoking status: Current Some Day Smoker     Types: Cigarettes    Smokeless tobacco: Current User     Types: Snuff    Tobacco comment: /2 a pack a week   Vaping Use    Vaping Use: Every day    Substances: Nicotine   Substance and Sexual Activity    Alcohol use: Not Currently     Comment: 2021    Drug use: Not Currently    Sexual activity: Not on file   Other Topics Concern    Not on file   Social History Narrative    Not on file     Social Determinants of Health     Financial Resource Strain:     Difficulty of Paying Living Expenses:    Food Insecurity:     Worried About Running Out of Food in the Last Year:     [address] in the Last Year:    Transportation Needs:     Lack of Transportation (Medical):      Lack of Transportation (Non-Medical):    Physical Activity:     Days of Exercise per Week:     Minutes of Exercise per Session:    Stress:     Feeling of Stress :    Social Connections:     Frequency of Communication with Friends and Family:     Frequency of Social Gatherings with Friends and Family:     Attends Restoration Services:     Active Member of Clubs or Organizations:     Attends Club or Organization Meetings:     Marital Status:    Intimate Partner Violence:     Fear of Current or Ex-Partner:     Emotionally Abused:     Physically Abused:     Sexually Abused:      Family Psychiatric History:     Family History   Problem Relation Age of Onset    Alcohol abuse Mother     Post-traumatic stress disorder Father     Depression Sister     Depression Brother     Completed Suicide  Paternal Aunt      History Review: The following portions of the patient's history were reviewed and updated as appropriate: allergies, current medications, past family history, past medical history, past social history, past surgical history and problem list     OBJECTIVE:     Vital signs in last 24 hours: There were no vitals filed for this visit  Laboratory Results: I have personally reviewed all pertinent laboratory/tests results  Medications Risks/Benefits:      Risks, Benefits And Possible Side Effects Of Medications:    Discussed risks and benefits of treatment with patient including risk of parkinsonian symptoms, metabolic syndrome, tardive dyskinesia and neuroleptic malignant syndrome related to treatment with antipsychotic medications, Risk of orthostatic hypotension with prazosin and risk of kidney impairment related to treatment with Lithium     Controlled Medication Discussion:     April has been filling controlled prescriptions on time as prescribed according to [address] 09/01/21    This note was shared with patient

## 2021-09-02 ENCOUNTER — APPOINTMENT (OUTPATIENT)
Dept: LAB | Facility: HOSPITAL | Age: 46
End: 2021-09-02
Payer: COMMERCIAL

## 2021-09-02 ENCOUNTER — OFFICE VISIT (OUTPATIENT)
Dept: PSYCHOLOGY | Facility: CLINIC | Age: 46
End: 2021-09-02
Payer: COMMERCIAL

## 2021-09-02 DIAGNOSIS — Z51.81 ENCOUNTER FOR THERAPEUTIC DRUG MONITORING: ICD-10-CM

## 2021-09-02 DIAGNOSIS — F31.9 BIPOLAR 1 DISORDER (HCC): Primary | ICD-10-CM

## 2021-09-02 DIAGNOSIS — F31.75 BIPOLAR DISORDER, IN PARTIAL REMISSION, MOST RECENT EPISODE DEPRESSED (HCC): ICD-10-CM

## 2021-09-02 DIAGNOSIS — F43.10 POST TRAUMATIC STRESS DISORDER (PTSD): ICD-10-CM

## 2021-09-02 LAB
ALBUMIN SERPL BCP-MCNC: 4.2 G/DL (ref 3–5.2)
ALP SERPL-CCNC: 73 U/L (ref 43–122)
ALT SERPL W P-5'-P-CCNC: 17 U/L
ANION GAP SERPL CALCULATED.3IONS-SCNC: 8 MMOL/L (ref 5–14)
AST SERPL W P-5'-P-CCNC: 18 U/L (ref 14–36)
BASOPHILS # BLD AUTO: 0.1 THOUSANDS/ΜL (ref 0–0.1)
BASOPHILS NFR BLD AUTO: 1 % (ref 0–1)
BILIRUB SERPL-MCNC: 0.58 MG/DL
BUN SERPL-MCNC: 10 MG/DL (ref 5–25)
CALCIUM SERPL-MCNC: 9.6 MG/DL (ref 8.4–10.2)
CHLORIDE SERPL-SCNC: 108 MMOL/L (ref 97–108)
CHOLEST SERPL-MCNC: 152 MG/DL
CO2 SERPL-SCNC: 26 MMOL/L (ref 22–30)
CREAT SERPL-MCNC: 0.64 MG/DL (ref 0.6–1.2)
EOSINOPHIL # BLD AUTO: 0.2 THOUSAND/ΜL (ref 0–0.4)
EOSINOPHIL NFR BLD AUTO: 2 % (ref 0–6)
ERYTHROCYTE [DISTWIDTH] IN BLOOD BY AUTOMATED COUNT: 13.3 %
GFR SERPL CREATININE-BSD FRML MDRD: 107 ML/MIN/1.73SQ M
GLUCOSE P FAST SERPL-MCNC: 104 MG/DL (ref 70–99)
HCT VFR BLD AUTO: 36.7 % (ref 36–46)
HDLC SERPL-MCNC: 31 MG/DL
HGB BLD-MCNC: 11.8 G/DL (ref 12–16)
LDLC SERPL CALC-MCNC: 111 MG/DL
LITHIUM SERPL-SCNC: 0.8 MMOL/L (ref 0.6–1.2)
LYMPHOCYTES # BLD AUTO: 1.6 THOUSANDS/ΜL (ref 0.5–4)
LYMPHOCYTES NFR BLD AUTO: 16 % (ref 25–45)
MCH RBC QN AUTO: 29.2 PG (ref 26–34)
MCHC RBC AUTO-ENTMCNC: 32.1 G/DL (ref 31–36)
MCV RBC AUTO: 91 FL (ref 80–100)
MONOCYTES # BLD AUTO: 0.6 THOUSAND/ΜL (ref 0.2–0.9)
MONOCYTES NFR BLD AUTO: 6 % (ref 1–10)
NEUTROPHILS # BLD AUTO: 7.6 THOUSANDS/ΜL (ref 1.8–7.8)
NEUTS SEG NFR BLD AUTO: 75 % (ref 45–65)
PLATELET # BLD AUTO: 277 THOUSANDS/UL (ref 150–450)
PMV BLD AUTO: 9.3 FL (ref 8.9–12.7)
POTASSIUM SERPL-SCNC: 4.4 MMOL/L (ref 3.6–5)
PROLACTIN SERPL-MCNC: 12.5 NG/ML
PROT SERPL-MCNC: 7.2 G/DL (ref 5.9–8.4)
RBC # BLD AUTO: 4.04 MILLION/UL (ref 4–5.2)
SODIUM SERPL-SCNC: 142 MMOL/L (ref 137–147)
TRIGL SERPL-MCNC: 52 MG/DL
WBC # BLD AUTO: 10.1 THOUSAND/UL (ref 4.5–11)

## 2021-09-02 PROCEDURE — S9480 INTENSIVE OUTPATIENT PSYCHIA: HCPCS

## 2021-09-02 PROCEDURE — 80053 COMPREHEN METABOLIC PANEL: CPT

## 2021-09-02 PROCEDURE — 36415 COLL VENOUS BLD VENIPUNCTURE: CPT

## 2021-09-02 PROCEDURE — 84146 ASSAY OF PROLACTIN: CPT

## 2021-09-02 PROCEDURE — 85025 COMPLETE CBC W/AUTO DIFF WBC: CPT

## 2021-09-02 PROCEDURE — 80061 LIPID PANEL: CPT

## 2021-09-02 PROCEDURE — 80178 ASSAY OF LITHIUM: CPT

## 2021-09-02 NOTE — PSYCH
Subjective:     Patient ID: Joycelyn Lopez is a 51 y o  female  [facility] Clinical Progress Notes      Specialized Services Documentation  Therapist must complete separate progress note for each specific clinical activity in which the individual participated during the day  Allied Therapy   [id number] Joycelyn Varghese  actively shared in [facility] group focused on motivation  Engaged in [facility] [facility] discussion and tasks exploring definition of, challenges to and ways to improve motivation  Group explored CBT and BA techniques to counteract limiting beliefs and set self up for success  Rewriting limiting belief through inclusion and counter beliefs practiced  April was engaged and shared impact of her own thinking and self-talk on her motivation  Remains active and offers relevant feedback  Some positive progress toward goals  Continue AT to explore wellness tools and encourage active practice  Tx Plan Objective: 1 1, Therapist:  Delfina GALAN    Case Management Note    Delfina GALAN    Current suicide risk : Low     1145 - 1150 Briefly met with Joycelyn Varghese to discuss weekend plans and supports  Joycelyn Varghese identified safe plans to stay with supports this weekend  She identified gains from the week and reviewed schedule for next week  Medications changes/added/denied? No    Treatment session number: 12 IOP 3    Individual Case Management Visit provided today?  Yes     [facility] follow up physician's orders: na

## 2021-09-02 NOTE — PSYCH
Subjective:     Patient ID: Joycelyn Mendez is a 51 y o  female  [facility] Clinical Progress Notes      Specialized Services Documentation  Therapist must complete separate progress note for each specific clinical activity in which the individual participated during the day  GROUP PSYCHOTHERAPY  ([id number]) Joycelyn Conti  attentively listened to [facility] share his life story as he co-led this session  Group encouraged power of learning about self, accepting illness and personal responsibility in recovery  Community resources reviewed in addition to personal resources like the affirmations  Progress toward goals noted  Continue psychotherapy to encourage self -awareness and healthy engagement of supports      TX Plan Objectives: 1 1, 1 2, 1 4   Therapist: Payal Haskins MA, [place]

## 2021-09-02 NOTE — PSYCH
Subjective:     Patient ID: April Laure Meckel is a 51 y o  female  [facility] Clinical Progress Notes      Specialized Services Documentation  Therapist must complete separate progress note for each specific clinical activity in which the individual participated during the day  Education Therapy   [phone] Contreras Varghese actively shared in morning assessment and goal review  Presented as Receptive related to readiness to learn  Joycelyn Varghese did complete goal from last treatment day identifying gaining responsibility  April did not present with any barriers to learning  [phone] Joycelyn Varghese engaged throughout the treatment day  Was engaged in learning related to Illness, Medication, Aftercare and Wellness Tools  Staff utilized Verbal, Written, A/V and Demonstration teaching methods    Joycelyn Varghese shared area of learning and set a goal for outside of program to enjoy the weekend and continue writing in her gratitude journal       Tx Plan Objective: 1 1,1 2, Therapist:  VANESSA Luis; GLADIS Rosales

## 2021-09-02 NOTE — PSYCH
Subjective:     Patient ID: Joycelyn Feliciano is a 51 y o  female  [facility] Clinical Progress Notes      Specialized Services Documentation  Therapist must complete separate progress note for each specific clinical activity in which the individual participated during the day  Group Psychotherapy ([id number]) April engaged in a active group playing the Connectbeamame (self-expression) card game  Each group member picked a card from either the #1 card deck or #2 card deck  #1 card deck was easier more surface level questions like, Adonica Bibber is a unique trait about yourself that no one knows?  Where card deck #2 had questions like, George Felton about a death experience you had as a child growing up    The purpose of the group is to be mindful and engaged with other group members questions and answers while supporting each other during the game  April will continue with life skills and psychotherapy groups  Good progress made towards treatment   Tx Plan Objective: 1 1,1 2 Therapist:  VANESSA Layton

## 2021-09-03 ENCOUNTER — APPOINTMENT (OUTPATIENT)
Dept: PSYCHOLOGY | Facility: CLINIC | Age: 46
End: 2021-09-03
Payer: COMMERCIAL

## 2021-09-07 ENCOUNTER — OFFICE VISIT (OUTPATIENT)
Dept: PSYCHOLOGY | Facility: CLINIC | Age: 46
End: 2021-09-07
Payer: COMMERCIAL

## 2021-09-07 DIAGNOSIS — F31.4 BIPOLAR DISORDER, CURRENT EPISODE DEPRESSED, SEVERE, WITHOUT PSYCHOTIC FEATURES (HCC): ICD-10-CM

## 2021-09-07 DIAGNOSIS — F31.9 BIPOLAR 1 DISORDER (HCC): Primary | ICD-10-CM

## 2021-09-07 DIAGNOSIS — F43.10 POST TRAUMATIC STRESS DISORDER (PTSD): ICD-10-CM

## 2021-09-07 PROCEDURE — S9480 INTENSIVE OUTPATIENT PSYCHIA: HCPCS

## 2021-09-07 NOTE — PSYCH
Subjective:     Patient ID: Joycelyn Hanley is a 51 y o  female  [facility] Clinical Progress Notes      Specialized Services Documentation  Therapist must complete separate progress note for each specific clinical activity in which the individual participated during the day  This was not shared due to this is a psychotherapy note  GROUP PSYCHOTHERAPY ([id number]) The group engaged in education about self-sabotaging behavior  We specifically focused on self-sabotaging habits related to how they approach change  Each member was asked to answer the following questions after reading the following bullets:     You expect yourself to succeed in making life changes without designating any time or mental space to accomplish them   You see your capacity to change as being dependent on other peoples behavior  For example, youd exercise more or make better spending choices if your spouse was more supportive and on board  Geary Meigs a perfectionist who is dismissive of incremental improvements, and youre only satisfied when 100 percent of a problem is fixed  - Which of the statements resonated with you the most? Why?  - How will you work on this? April seemed very engaged throughout the group session and was willing to provide feedback to peers  April picked the first statement  April is encouraged to make progress towards goals and objectives through group participation and will continue to attend psychotherapy group  Tx plan objective: 1 1, 1 2   Therapist: Sindy Meraz MA    Education Therapy   [id number] Joycelyn Varghese actively shared in morning assessment and goal review  Presented as Receptive related to readiness to learn  Joycelyn Varghese was not here on Friday, 09/03/2021  did not present with any barriers to learning  [id number] Joycelyn Varghese engaged throughout the treatment day  Was engaged in learning related to Illness, Aftercare and Wellness Tools   Staff utilized Verbal and A/V teaching methods  Joycelyn Varghese shared area of learning and set a goal for outside of program to engage in gratitude journaling        Tx Plan Objective: 1 4, Therapist:  Joleen Segovia MA

## 2021-09-07 NOTE — PSYCH
Subjective:     Patient ID: April Milind Cortez is a 51 y o  female  [facility] Clinical Progress Notes      Specialized Services Documentation  Therapist must complete separate progress note for each specific clinical activity in which the individual participated during the day  GROUP PSYCHOTHERAPY ([id number]) Members were educated on [employer] 3 levels of forgiveness and the 12 steps of forgiveness  Members discussed their understanding of forgiveness and what it would mean to forgive someone or themselves  Members shared personal disclosures of times that it was challenging to forgive and barriers to the forgiving process  Members were encouraged to utilize gratitude and remember that forgiveness is an ongoing process that unfolds over time  April questioned the ability to relief physical pain and the difficulty relieving emotional pain  Members expressed empathy and understanding and encouragement to these questions  April continues to demonstrate insight and progress through verbal disclosures in group  Continue with psychotherapy  TX Plan Objectives: 1 1, 1 2, 1 4   Therapist: Jo Ann Morel MA, [facility] [place]       Case Management Note    Dinesh Calderón, [facility] [place]    Current suicide risk : Low     ([id number]) April reviewed treatment with CM and discharge planning  Reminded to check medications to see if refill will be needed prior to discharge  Medications changes/added/denied? No    Treatment session number: 13 IOP 4    Individual Case Management Visit provided today?  Yes     [facility] follow up physician's orders: None at this time

## 2021-09-07 NOTE — PSYCH
Subjective:     Patient ID: Joycelyn Moss is a 51 y o  female  [facility] Clinical Progress Notes      Specialized Services Documentation  Therapist must complete separate progress note for each specific clinical activity in which the individual participated during the day  Allied Therapy   [id number]--  Getachew Varghese  actively shared in [facility] group that focused on recognizing our authentic self and merging it with our adaptive self  Bailey Lockwood engaged in group by actively listening, discussing, taking notes, and creating a verse and chorus to a song that explored their personal identity  Group explored practice of fill-in-the-blank  songwriting to explore authentic self, received education about what the authentic self is, as well as learned the 12 steps involved to exploring how to develop their authentic self  Joycelyn Varghese identified that she enjoyed this activity as it forced her to create 6 positive identity markers that she believes about herself as she typically struggles to come up with any positive markers  Some effort noted toward treatment goal   Continue AT to encourage the development and practice of developing authentic self  to alleviate symptoms and support wellness     Tx Plan Objective: 1 1, 1 2, 1 4       Therapist:  ADAMA Kline

## 2021-09-07 NOTE — PSYCH
Assessment/Plan:       Diagnoses and all orders for this visit:     Bipolar disorder, current episode depressed, severe, without psychotic features ([facility] [address] )     Post traumatic stress disorder (PTSD)     Subjective:      Patient ID: April Read Yonny Varghese is a 51 y o  female  [facility] Treatment Plan   AREAS OF NEED: Bipolar disorder and PTSD as evidenced by increased anxiety, depression, suicidal attempts on medications and isolation due to limited supports and separation from  and traumatic history  Date Initiated: 08/12/21     Strengths: "smart, kind, hard working, determined and resilient"         LONG TERM GOAL:   Date Initiated: 08/12/21  1 0 I will identify 3 ways my suicidal ideation and pseudoseizures have subsided    Target Date: 09/09/21  Completion Date:         SHORT TERM OBJECTIVES:      Date Initiated: 08/12/21  1 1 I will learn 3 coping skills to decrease my anxiousness and depressive symptoms    Revision Date: 08/24/21 09/07/21   Target Date: 08/24/21  Completion Date:      Date Initiated: 08/12/21  1 2 I will identify 3 ways that I have attempted reconnections with my children  Revision Date: 08/24/21 09/07/21   Target Date: 08/24/21  Completion Date:     Date Initiated: 08/12/21  1 3 I will take medications as prescribed and share questions and concerns if arise     Revision Date: 08/24/21 09/07/21   Target Date: 08/24/21  Completion Date:      Date Initiated: 08/12/21  1 4 I will identify 3 ways my supports can assist in my recovery and agree to staff/support contact as indicated     Revision Date: 08/24/21 09/07/21   Target Date: 08/24/21  Completion Date:            7 DAY REVISION:  Date Initiated: 08/24/21   1 5 I will brainstorm and create a physical coping skills to release energy other than removing myself and going for a walk  Revision Date: 09/07/21   Target Date: 09/03/21  Completion Date:     Date Initiated: 08/24/21   1 6 I will complete 1 page a day of my [employer] to prepare for discharge  Revision Date: 09/07/21   Target Date: 09/03/21  Completion Date:    Date Initiated: 09/07/21   1 7 I will utilize my mindfulness skills to identify 2 scenarios where I practice a coping skill and didn't give myself credit to boost my confidence  Revision Date:   Target Date: 09/27/21  Completion Date:        PSYCHIATRY:  Date Initiated:  08/12/21  Medication Management and Education       Revision Date: 08/24/21 09/07/21   1 3 Continue medication management       The person(s) responsible for carrying out the plan is Daisha Ramirez MD     NURSING/SYMPTOM EDUCATION:  Date Initiated: 08/12/21       1 1, 1 2  1 3, 1 4 Provide wellness/symptoms and skill education groups three to five days weekly to educate Laurie Varghese on signs and symptoms of diagnoses, skills to manage stressors, and medication questions that will be addressed by the treatment team         Revision date: 08/24/21 09/07/21   1 1,1 2,1 3,1 4,1 5 Continue to encourage Joycelyn Varghese to participate in wellness groups daily to learn about symptoms, coping strategies and warning signs to promote relapse prevention         The person(s) responsible for carrying out the plan is NANCY Neves, HAILEY and Brand Box, [place]     PSYCHOLOGY:   Date Initiated: 08/12/21       1 1, 1 2, 1 4 Provide psychotherapy group 5 times per week to allow opportunity for Joycelyn Varghese  to explore stressors and ways of coping  Revision Date: 08/24/21   1 1,1 2,1 4,1 5  Continue to provide psychotherapy group daily to Joycelyn Varghese and encourage sharing of stressors, skills and positive change  The person(s) responsible for carrying out the plan is Khushbu Stafford MA, [facility]     ALLIED THERAPY:   Date Initiated: 08/12/21  1 1,1 2 Engage Joycelyn Varghese in AT group 5 times daily to encourage development and use of wellness tools to decrease symptoms and promote recovery through meaningful activity    Revision Date: 08/24/21 09/07/21 1 1,1 2,1 5 Continue to engage Joycelyn Varghese to participate in AT group to practice wellness tools within program and transfer to home sharing successes and barriers through healthy task involvement        The person(s) responsible for carrying out the plan is ADAMA Chan and ADAMA Potter     CASE MANAGEMENT:   Date Initiated: 08/12/21      1 0 This  will meet with Joycelyn Varghese  3-4 times weekly to assess treatment progress, discharge planning, connection to community supports and UR as indicated  Revision Date: 08/24/21 09/07/21   1 0 Continue to meet with Joycelyn Varghese 3-4 times weekly to assess growth, work toward goals, continued treatment needs, dc planning and use of supports  The person(s) responsible for carrying out the plan is Tierra Mcgregor MA, [place]     TREATMENT REVIEW/COMMENTS:      DISCHARGE CRITERIA: Identify 3 signs of progress and complete relapse prevention plan     DISCHARGE PLAN: Connect with identified outpatient providers     Estimated Length of Stay: 10 treatment days       Diagnosis and Treatment Plan explained to April, April relates understanding diagnosis and is agreeable to Treatment Plan             CLIENT COMMENTS / Please share your thoughts, feelings, need and/or experiences regarding your treatment plan: _____________________________________________________________________________________________________________________________________________________________________________________________________________________________________________________________________________________________________________________ Date/Time: ______________      Patient Signature: _________________________________      Date/Time: ______________       Signature: _________________________________     Date/Time: ______________

## 2021-09-08 ENCOUNTER — APPOINTMENT (OUTPATIENT)
Dept: PSYCHOLOGY | Facility: CLINIC | Age: 46
End: 2021-09-08
Payer: COMMERCIAL

## 2021-09-09 ENCOUNTER — OFFICE VISIT (OUTPATIENT)
Dept: PSYCHOLOGY | Facility: CLINIC | Age: 46
End: 2021-09-09
Payer: COMMERCIAL

## 2021-09-09 DIAGNOSIS — F43.10 POST TRAUMATIC STRESS DISORDER (PTSD): ICD-10-CM

## 2021-09-09 DIAGNOSIS — F31.9 BIPOLAR 1 DISORDER (HCC): Primary | ICD-10-CM

## 2021-09-09 PROCEDURE — S9480 INTENSIVE OUTPATIENT PSYCHIA: HCPCS

## 2021-09-09 NOTE — PSYCH
Subjective:     Patient ID: April William Lopez is a 51 y o  female  [facility] Clinical Progress Notes      Specialized Services Documentation  Therapist must complete separate progress note for each specific clinical activity in which the individual participated during the day  Group Psychotherapy ([id number]) April engaged in a group focusing on practicing mindfulness  Each group member was given a paper to write down four different unique traits or obstacles they have accomplished in life  After writing down the four things then each group member tore them into four different pieces of paper and then crumbled them up and threw in one big basket   then picked one out of the basket at a time with the group having to guess who it belonged too  Group members were encouraged to relate to similarities of other group members while also being mindful and engaging during the group  April will continue with life skills and psychotherapy groups  Good progress made towards treatment   Tx Plan Objective: 1 1,1 2 Therapist:  VANESSA Singer

## 2021-09-09 NOTE — PSYCH
Subjective:     Patient ID: Joycelyn Olivo is a 51 y o  female  [facility] Clinical Progress Notes      Specialized Services Documentation  Therapist must complete separate progress note for each specific clinical activity in which the individual participated during the day  GROUP PSYCHOTHERAPY ([id number])  The group was educated on the grounding technique called Emotional Freedom Tapping (EFT)  Members practiced EFT by following along to a prompt titled "Quieting the Voices that Say You Aren't Enough and 3 Minute for Anxiety"  Members rated the intensity of their thoughts before and after participating in the tapping exercise  Group discussed scenarios where they could use the technique, how to adapt the practice to their needs and understanding insights from past  April shared her understanding of EFT and reviewed it with the group  She engaged in the practice and realized that saying out loud was helpful  She encouraged others to fight through the skepticism and continue practicing this at home  April continues to make progress towards goals through verbal participation and engagement in activity during group  Continue with Psychotherapy  TX Plan Objectives: 1 1, 1 2, 1 4   Therapist: Bhavya Chi MA, [facility] [place]     Case Management Note    Dinesh Nathan, [facility] [place]    Current suicide risk : Low     ([id number]) April shared that she started the day irritable  She is wondering if it is her medications, but also considered her anxiety about returning to work  Discussed an appropriate timeline and plan that April would feel comfortable with in returning to work  Questioned the possibility of returning work on a reduced schedule in order to feel confident in her skills to perform her job  April stated she will call work to see if this was an option  Reviewed and signed treatment plan  Medications changes/added/denied?  No    Treatment session number: 14 IOP 5    Individual Case Management Visit provided today?  Yes     [facility] follow up physician's orders: None at this time

## 2021-09-09 NOTE — PSYCH
Subjective:     Patient ID: Joycelyn Tejeda is a 51 y o  female  [facility] Clinical Progress Notes      Specialized Services Documentation  Therapist must complete separate progress note for each specific clinical activity in which the individual participated during the day  Education Therapy   [id number] Janicenati Varghese actively shared in morning assessment and goal review  Presented as Receptive related to readiness to learn  Joycelyn Varghese did complete goal from last treatment day identifying gaining responsibility and hope  April did not present with any barriers to learning  [id number] Joycelyn Varghese engaged throughout the treatment day  Was engaged in learning related to Illness, Medication, Aftercare and Wellness Tools  Staff utilized Verbal, Written, A/V and Demonstration teaching methods    Joycelyn Varghese shared area of learning and set a goal for outside of program to write in her gratitude journal       Tx Plan Objective: 1 1,1 2,1 4, Therapist:  VANESSA Winston; GLADIS Fonseca

## 2021-09-10 ENCOUNTER — APPOINTMENT (OUTPATIENT)
Dept: PSYCHOLOGY | Facility: CLINIC | Age: 46
End: 2021-09-10
Payer: COMMERCIAL

## 2021-09-13 ENCOUNTER — OFFICE VISIT (OUTPATIENT)
Dept: PSYCHOLOGY | Facility: CLINIC | Age: 46
End: 2021-09-13
Payer: COMMERCIAL

## 2021-09-13 ENCOUNTER — OFFICE VISIT (OUTPATIENT)
Dept: PSYCHIATRY | Facility: CLINIC | Age: 46
End: 2021-09-13
Payer: COMMERCIAL

## 2021-09-13 DIAGNOSIS — F31.32 BIPOLAR I DISORDER, MOST RECENT EPISODE DEPRESSED, MODERATE (HCC): Primary | ICD-10-CM

## 2021-09-13 DIAGNOSIS — G25.71 AKATHISIA: ICD-10-CM

## 2021-09-13 DIAGNOSIS — F43.10 POST TRAUMATIC STRESS DISORDER (PTSD): ICD-10-CM

## 2021-09-13 DIAGNOSIS — F31.9 BIPOLAR 1 DISORDER (HCC): Primary | ICD-10-CM

## 2021-09-13 PROCEDURE — S9480 INTENSIVE OUTPATIENT PSYCHIA: HCPCS

## 2021-09-13 PROCEDURE — 99213 OFFICE O/P EST LOW 20 MIN: CPT | Performed by: NURSE PRACTITIONER

## 2021-09-13 RX ORDER — PROPRANOLOL HYDROCHLORIDE 10 MG/1
10 TABLET ORAL 2 TIMES DAILY
Qty: 60 TABLET | Refills: 1 | Status: SHIPPED | OUTPATIENT
Start: 2021-09-13

## 2021-09-13 RX ORDER — CARIPRAZINE 1.5 MG/1
1.5 CAPSULE, GELATIN COATED ORAL DAILY
Qty: 60 CAPSULE | Refills: 1 | Status: SHIPPED | OUTPATIENT
Start: 2021-09-13

## 2021-09-13 NOTE — PSYCH
MEDICATION MANAGEMENT NOTE        [facility]      Name and Date of Birth:  Joycelyn Varghese 51 y o  1975 MRN: [id number]    Date of Visit: September 13, 2021    SUBJECTIVE:    April is seen today for a follow up for Bipolar Disorder and PTSD  She continues to do relatively well since the last visit  She does report ongoing akathisia  Cogentin has helped with her tremors, but not the restlessness  She is starting to feel restless/irritable  We discussed option of discontinuing Vraylar, but she has had great results with addition of Francenia Peak for her depressive symptoms and wishes to continue  We discussed other medications to treat the akathisia and she is agreeable to trial of propanolol and will start at 10 mg BID to assess for tolerability  She is aware of risk of hypotension due to prazosin  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  She denies any suicidal ideation, intent or plan at present; denies homicidal ideation, intent or plan at present  She still reports akathisia  Jerryl Rm   HPI ROS Appetite Changes and Sleep:     She reports fluctuating sleep pattern, fluctuating appetite, fluctuating energy levels    Review Of Systems:      Constitutional negative   ENT negative   Cardiovascular negative   Respiratory negative   Gastrointestinal negative   Genitourinary negative   Musculoskeletal negative   Integumentary negative   Neurological negative   Endocrine negative   Other Symptoms none, all other systems are negative       Past Psychiatric History:     Past Inpatient Psychiatric Treatment:   none since last visit  Past Outpatient Psychiatric Treatment:    none since last visit  Past Suicide Attempts: none since last visit  Past Violent Behavior: none since last visit  Past Psychiatric Medication Trials: multiple psychiatric medication trials    Traumatic History:     Abuse: no change since last visit  Other Traumatic Events: none since last visit     Past Medical History:    Past Medical History:   Diagnosis Date    Head injury      Past Medical History Pertinent Negatives:   Diagnosis Date Noted    Seizures ([facility] [address] ) 2021     Past Surgical History:   Procedure Laterality Date    BACK SURGERY       SECTION      CYST REMOVAL       Allergies   Allergen Reactions    Amoxicillin Hives and Rash       Substance Abuse History:    Social History     Substance and Sexual Activity   Alcohol Use Not Currently    Comment: 2021     Social History     Substance and Sexual Activity   Drug Use Not Currently       Social History:    Social History     Socioeconomic History    Marital status: /Civil Union     Spouse name: Not on file    Number of children: 3    Years of education: Not on file    Highest education level: Bachelor's degree (e g , BA, AB, BS)   Occupational History    Occupation: short term disability   Tobacco Use    Smoking status: Current Some Day Smoker     Types: Cigarettes    Smokeless tobacco: Current User     Types: Snuff    Tobacco comment: 1/2 a pack a week   Vaping Use    Vaping Use: Every day    Substances: Nicotine   Substance and Sexual Activity    Alcohol use: Not Currently     Comment: 2021    Drug use: Not Currently    Sexual activity: Not on file   Other Topics Concern    Not on file   Social History Narrative    Not on file     Social Determinants of Health     Financial Resource Strain:     Difficulty of Paying Living Expenses:    Food Insecurity:     Worried About Running Out of Food in the Last Year:     [address] in the Last Year:    Transportation Needs:     Lack of Transportation (Medical):      Lack of Transportation (Non-Medical):    Physical Activity:     Days of Exercise per Week:     Minutes of Exercise per Session:    Stress:     Feeling of Stress :    Social Connections:     Frequency of Communication with Friends and Family:     Frequency of Social Gatherings with Friends and Family:     Attends Judaism Services:     Active Member of Clubs or Organizations:     Attends Club or Organization Meetings:     Marital Status:    Intimate Partner Violence:     Fear of Current or Ex-Partner:     Emotionally Abused:     Physically Abused:     Sexually Abused:        Family Psychiatric History:     Family History   Problem Relation Age of Onset    Alcohol abuse Mother     Post-traumatic stress disorder Father     Depression Sister     Depression Brother     Completed Suicide  Paternal Aunt        History Review: The following portions of the patient's history were reviewed and updated as appropriate: allergies, current medications, past family history, past medical history, past social history, past surgical history and problem list          OBJECTIVE:     Vital signs in last 24 hours: There were no vitals filed for this visit      Mental Status Evaluation:    Appearance age appropriate, casually dressed   Behavior cooperative, calm   Speech normal rate, normal volume, normal pitch   Mood depressed   Affect flat   Thought Processes organized, goal directed   Associations intact associations   Thought Content no overt delusions   Perceptual Disturbances: no auditory hallucinations, no visual hallucinations   Abnormal Thoughts  Risk Potential Suicidal ideation - None  Homicidal ideation - None  Potential for aggression - No   Orientation oriented to person, place, time/date and situation   Memory recent and remote memory grossly intact   Consciousness alert and awake   Attention Span Concentration Span attention span and concentration are age appropriate   Intellect appears to be of average intelligence   Insight intact   Judgement intact   Muscle Strength and  Gait normal muscle strength and normal muscle tone, normal gait and normal balance   Motor activity no abnormal movements   Language no difficulty naming common objects, no difficulty repeating a phrase, no difficulty writing a sentence   Fund of Knowledge adequate knowledge of current events  adequate fund of knowledge regarding past history  adequate fund of knowledge regarding vocabulary    Pain none   Pain Scale 0       Laboratory Results: I have personally reviewed all pertinent laboratory/tests results  Suicide/Homicide Risk Assessment:    Risk of Harm to Self:  Recent Specific Risk Factors include: diagnosis of mood disorder  Protective Factors: no current suicidal ideation  Based on today's assessment, April presents the following risk of harm to self: none    Risk of Harm to Others:  Recent Specific Risk Factors include: none  Protective Factors: no current homicidal ideation, ability to adapt to change, able to manage anger well, access to mental health treatment  Based on today's assessment, April presents the following risk of harm to others: none    The following interventions are recommended: no intervention changes needed    Assessment/Plan:       Diagnoses and all orders for this visit:    Bipolar I disorder, most recent episode depressed, moderate ([address] )    Post traumatic stress disorder (PTSD)          Treatment Recommendations/Precautions:    Continue medications the same    Aware of 24 hour and weekend coverage for urgent situations accessed by calling [facility] main practice number  Continues partial program    Medications Risks/Benefits      Risks, Benefits And Possible Side Effects Of Medications:    Risks, benefits, and possible side effects of medications explained to April and she verbalizes understanding and agreement for treatment  Controlled Medication Discussion:     Not applicable - controlled prescriptions are not prescribed by this practice    Psychotherapy Provided:     Individual psychotherapy provided: Medications, treatment progress and treatment plan reviewed with April       Treatment Plan:    Completed and signed during the session: Not applicable - Treatment Plan not due at this session    Danay Serrano, [address] 09/13/21     This note was not shared with the patient due to this is a psychotherapy note

## 2021-09-13 NOTE — PSYCH
Subjective:     Patient ID: Joycelyn Doll is a 51 y o  female  [facility] Clinical Progress Notes      Specialized Services Documentation  Therapist must complete separate progress note for each specific clinical activity in which the individual participated during the day  GROUP PSYCHOTHERAPY ([id number]) Members engaged in a therapeutic activity while having a safe and non-judgmental place to express their feelings, values and experiences  Members were able to increase their understanding of their own strengths and how people perceive them  Group experiences promoted personal development, identifying strengths through other peoples eyes and creating a positive foundation when things seem tough  The group participated in group discussions about personal disclosures, offering feedback and encouraging others to think in different perspectives  April shared that she was excited to learn that people are listening to her stories and equating strengths with the stories she's shared  April continues to demonstrate insight and progress through activity participation and verbal disclosures in group  Continue with psychotherapy  TX Plan Objectives: 1 1, 1 2, 1 4   Therapist: Catrina Maddox MA, [facility]     Case Management Note    Catrina Maddox MA, [facility]    Current suicide risk : Low     A case management session is not scheduled today with Joycelyn Conti ; additionally, they did not request a CM meeting  Next scheduled session is 09/14/21  Medications changes/added/denied? Yes - See Sarah Cleaning's note     Treatment session number: 15 IOP 6    Individual Case Management Visit provided today?  No    [facility] follow up physician's orders: None at this time

## 2021-09-13 NOTE — PSYCH
Subjective:     Patient ID: Joycelyn Mora is a 51 y o  female  [facility] Clinical Progress Notes      Specialized Services Documentation  Therapist must complete separate progress note for each specific clinical activity in which the individual participated during the day  Allied Therapy  [id number]- Joycelyn Varghese actively shared in [facility] group focused on the use of dialectic statements and thoughts to decrease emotional intensity and black and white thinking as well as acknowledging that two opinions or feelings can co-exist and both can be true  Joycelyn Varghese engaged in group by actively listening to music, writing their own lyrics, music and art,  and taking notes  Group explored practice of writing their own song lyrics to a well known tune to explore their own dialectic thoughts as well as listening to the same piece of music and identifying two emotions they heard in the piece  Joycelyn Varghese shared that they were weak but strong enough  Some effort noted toward treatment goal   Continue AT to encourage the development and practice of dialectic statements to  alleviate symptoms and support wellness  Tx Plan Objective: 1 1, 1 2, & 1 4        Therapist:  ADAMA Slade; GLADIS Vega    Education Therapy   [id number] Joycelyn Varghese actively shared in morning assessment and goal review  Presented as Receptive related to readiness to learn  Joycelyn Varghese did complete goal from last treatment day identifying gaining responsibility  April did not present with any barriers to learning  [phone] Joycelyn Varghese engaged throughout the treatment day  Was engaged in learning related to Illness, Medication, Aftercare and Wellness Tools  Staff utilized Verbal, Written, A/V and Demonstration teaching methods  Joycelyn Varghese shared area of learning and set a goal for outside of program to write in gratitude journal and  medications        Tx Plan Objective: 1 1,1 2,1 4, Therapist:  Author Trang AGLAN; GLADIS Leiva

## 2021-09-13 NOTE — PSYCH
Subjective:     Patient ID: Joycelyn Tejeda is a 51 y o  female  [facility] Clinical Progress Notes      Specialized Services Documentation  Therapist must complete separate progress note for each specific clinical activity in which the individual participated during the day  Group Psychotherapy ([phone]) April was involved in a group that started with a video on a speaker from a lynnette talk presentation geared around how phones can steal our attention and get us pulled in longer than we attended  Transitioning into an open discussion portion where April participated sharing how phones/social media can affect our mood and overall well-being  Boundaries such tracking your time on certain apps was one way to monitor and assess ones own current issues regarding their phone use  April will continue with life skills and psychotherapy groups  Good progress made towards treatment   Tx Plan Objective: 1 1,1 2 Therapist:  VANESSA Winston

## 2021-09-14 ENCOUNTER — OFFICE VISIT (OUTPATIENT)
Dept: PSYCHOLOGY | Facility: CLINIC | Age: 46
End: 2021-09-14
Payer: COMMERCIAL

## 2021-09-14 DIAGNOSIS — F31.9 BIPOLAR 1 DISORDER (HCC): Primary | ICD-10-CM

## 2021-09-14 DIAGNOSIS — F43.10 POST TRAUMATIC STRESS DISORDER (PTSD): ICD-10-CM

## 2021-09-14 PROCEDURE — S9480 INTENSIVE OUTPATIENT PSYCHIA: HCPCS

## 2021-09-14 NOTE — PSYCH
Subjective:     Patient ID: Joycelyn Echavarria is a 51 y o  female  [facility] Clinical Progress Notes      Specialized Services Documentation  Therapist must complete separate progress note for each specific clinical activity in which the individual participated during the day  Group Psychotherapy ([phone]) April engaged in an education group on the [place] skills from mindfulness theory of Dialectical Behavioral therapy (DBT)  The HOW skills cover three main domains of: [place], Stay Focused, and Do What Works  [facility] skills then cover three objectives of: Observe, Describe, and Participate  Each group member then participated in a small exercise where two group members look at each other and observe what each other is wearing  After, observing each member turned their backs and rearranged or moved around things they were wearing  Lastly, each group member turned back to each other and stated what they noticed regarding any changes the other group member might have made  This itself helped group members practice mindfulness while observing and describing each other in a non-judgmental way  April will continue with life skills and psychotherapy groups  Good progress made towards treatment   Tx Plan Objective: 1 1,1 2 Therapist:  VANESSA Moraes

## 2021-09-14 NOTE — PSYCH
Subjective:     Patient ID: April Caryn Doll is a 51 y o  female  [facility] Clinical Progress Notes      Specialized Services Documentation  Therapist must complete separate progress note for each specific clinical activity in which the individual participated during the day  Case Management Note    Dinesh Bowling [place]    Current suicide risk : Low     ([id number]) April discussed discharge plan and her nervousness to return to work  She reports that she continues to feel the restlessness and body movement  She acknowledged that she just received the medications and will give them a few days to see if any changes occur  She reports that she has a medication management appointment next week if changes need to be made  Will inform CM of next scheduled therapy appointment  Medications changes/added/denied? No    Treatment session number: 16 IOP 7    Individual Case Management Visit provided today? Yes     [facility] follow up physician's orders: None at this time

## 2021-09-14 NOTE — PSYCH
Subjective:     Patient ID: Joycelyn Bass is a 51 y o  female  [facility] Clinical Progress Notes      Specialized Services Documentation  Therapist must complete separate progress note for each specific clinical activity in which the individual participated during the day  This was not shared due to this is a psychotherapy note  GROUP PSYCHOTHERAPY ([id number])  The group engaged in a discussion about habits and ways to integrate new habits into their current lifestyles  The Habit Plan worksheet provides instructions, examples, and a template for clients to create their own plan  Members were asked to fill in the blank to the followin  After (Existing habit) I will (New habit)  April seemed very engaged throughout the group session  April stated that after getting in bed, they will write in their gratitude journal   April is encouraged to make progress towards goals and objectives through group participation and will continue to attend psychotherapy group       Tx plan objective: 1 1, 1 2   Therapist: Anna Drew MA

## 2021-09-14 NOTE — PSYCH
Subjective:     Patient ID: Joycelyn Howell is a 51 y o  female  [facility] Clinical Progress Notes      Specialized Services Documentation  Therapist must complete separate progress note for each specific clinical activity in which the individual participated during the day  Allied Therapy  [id number]- April Sushila Castaneday actively shared and participated in [facility] group focused on learning the key features of the  6 stages to change, identifying aspects to preparation for change, analyzing songs to think about where the narrator of the song is within the cycle of change, and sharing personal reflections within group discussion  Chente Person [place] shared that she needs to start to believe that taking small steps is still valid  April shared good insight as to why change can be challenging  Some effort noted toward treatment goal   Continue AT to encourage the development and practice of analyzing the change cycle to  alleviate symptoms and support wellness  Tx Plan Objective: 1 1, 1 2, 1 4     Therapist:  ADAMA Dang & Juan Jose Lovett [facility] Therapy   [id number] April Sushila Varghese actively shared in morning assessment and goal review  Presented as Receptive related to readiness to learn  April Sushila Varghese did complete goal from last treatment day identifying gaining responsibility and hope  did not present with any barriers to learning  [phone] April Sushila Varghese engaged throughout the treatment day  Was engaged in learning related to Illness, Medication, Aftercare and Wellness Tools  Staff utilized Verbal, Written, A/V and Demonstration teaching methods  April Sushila Varghese shared area of learning and set a goal for outside of program to write in gratitude journal, work on discharge worksheets        Tx Plan Objective: 1 1,1 2,1 4, Therapist:  Reynaldo GALAN; Juan Jose Lovett [facility]

## 2021-09-15 ENCOUNTER — APPOINTMENT (OUTPATIENT)
Dept: PSYCHOLOGY | Facility: CLINIC | Age: 46
End: 2021-09-15
Payer: COMMERCIAL

## 2021-09-15 ENCOUNTER — DOCUMENTATION (OUTPATIENT)
Dept: PSYCHOLOGY | Facility: CLINIC | Age: 46
End: 2021-09-15

## 2021-09-15 DIAGNOSIS — F43.10 POST TRAUMATIC STRESS DISORDER (PTSD): ICD-10-CM

## 2021-09-15 DIAGNOSIS — F31.9 BIPOLAR 1 DISORDER (HCC): Primary | ICD-10-CM

## 2021-09-15 NOTE — PROGRESS NOTES
Assessment/Plan:       Diagnoses and all orders for this visit:     Bipolar disorder, current episode depressed, severe, without psychotic features ([facility] [address] )     Post traumatic stress disorder (PTSD)     Subjective:      Patient ID: Joycelyn Varghese is a 51 y o  female  [facility] Treatment Plan   AREAS OF NEED: Bipolar disorder and PTSD as evidenced by increased anxiety, depression, suicidal attempts on medications and isolation due to limited supports and separation from  and traumatic history  Date Initiated: 08/12/21     Strengths: "smart, kind, hard working, determined and resilient"         LONG TERM GOAL:   Date Initiated: 08/12/21  1 0 I will identify 3 ways my suicidal ideation and pseudoseizures have subsided    Target Date: 09/09/21  Completion Date: 09/16/21 - DISCHARGE         SHORT TERM OBJECTIVES:      Date Initiated: 08/12/21  1 1 I will learn 3 coping skills to decrease my anxiousness and depressive symptoms    Revision Date: 08/24/21 09/07/21   Target Date: 08/24/21  Completion Date: 09/16/21 - DISCHARGE     Date Initiated: 08/12/21  1 2 I will identify 3 ways that I have attempted reconnections with my children  Revision Date: 08/24/21 09/07/21   Target Date: 08/24/21  Completion Date: 09/16/21 - DISCHARGE     Date Initiated: 08/12/21  1 3 I will take medications as prescribed and share questions and concerns if arise     Revision Date: 08/24/21 09/07/21   Target Date: 08/24/21  Completion Date: 09/16/21 - DISCHARGE     Date Initiated: 08/12/21  1 4 I will identify 3 ways my supports can assist in my recovery and agree to staff/support contact as indicated     Revision Date: 08/24/21 09/07/21   Target Date: 08/24/21  Completion Date: 09/16/21 - DISCHARGE            7 DAY REVISION:  Date Initiated: 08/24/21   1 5 I will brainstorm and create a physical coping skills to release energy other than removing myself and going for a walk    Revision Date: 09/07/21   Target Date: 09/03/21  Completion Date: 09/16/21 - DISCHARGE     Date Initiated: 08/24/21   1 6 I will complete 1 page a day of my [employer] to prepare for discharge    Revision Date: 09/07/21   Target Date: 09/03/21  Completion Date: 09/16/21 - DISCHARGE     Date Initiated: 09/07/21   1 7 I will utilize my mindfulness skills to identify 2 scenarios where I practice a coping skill and didn't give myself credit to boost my confidence  Revision Date:   Target Date: 09/27/21  Completion Date: 09/16/21 - DISCHARGE        PSYCHIATRY:  Date Initiated:  08/12/21  Medication Management and Education       Revision Date: 08/24/21 09/07/21 09/16/21 - DISCHARGE  1 3 Continue medication management       The person(s) responsible for carrying out the plan is Daisha Ramirez MD     NURSING/SYMPTOM EDUCATION:  Date Initiated: 08/12/21       1 1, 1 2  1 3, 1 4 Provide wellness/symptoms and skill education groups three to five days weekly to educate Sahrath Varghese on signs and symptoms of diagnoses, skills to manage stressors, and medication questions that will be addressed by the treatment team    Angela Freeman date: 08/24/21 09/07/21 09/16/21 - DISCHARGE  1 1,1 2,1 3,1 4,1 5 Continue to encourage Joycelyn Varghese to participate in wellness groups daily to learn about symptoms, coping strategies and warning signs to promote relapse prevention         The person(s) responsible for carrying out the plan is NANCY Molina, HAILEY and Aime [place], [place]     PSYCHOLOGY:   Date Initiated: 08/12/21       1 1, 1 2, 1 4 Provide psychotherapy group 5 times per week to allow opportunity for Joycelyn Conti Deputy  to explore stressors and ways of coping  Revision Date: 08/24/21 09/07/21 09/16/21 - DISCHARGE  1 1,1 2,1 4,1 5  Continue to provide psychotherapy group daily to April [facility] and encourage sharing of stressors, skills and positive change     The person(s) responsible for carrying out the plan is Osorio Morton, [address] [place], [place]     ALLIED THERAPY:   Date Initiated: 08/12/21  1 1,1 2 Engage Joycelyn Varghese in AT group 5 times daily to encourage development and use of wellness tools to decrease symptoms and promote recovery through meaningful activity  Revision Date: 08/24/21 09/07/21 09/16/21 - DISCHARGE  1 1,1 2,1 5 Continue to engage Joycelyn Varghese to participate in AT group to practice wellness tools within program and transfer to home sharing successes and barriers through healthy task involvement        The person(s) responsible for carrying out the plan is KAREN RitterBC and Scottie Bumpers, [place]-BC     CASE MANAGEMENT:   Date Initiated: 08/12/21      1 0 This  will meet with Joycelyn Conti   3-4 times weekly to assess treatment progress, discharge planning, connection to community supports and UR as indicated  Revision Date: 08/24/21 09/07/21 09/16/21 - DISCHARGE  1 0 Continue to meet with Joycelyn Virgen  3-4 times weekly to assess growth, work toward goals, continued treatment needs, dc planning and use of supports  The person(s) responsible for carrying out the plan is Mariluz Knox MA, [place]     TREATMENT REVIEW/COMMENTS:      DISCHARGE CRITERIA: Identify 3 signs of progress and complete relapse prevention plan     DISCHARGE PLAN: Connect with identified outpatient providers     Estimated Length of Stay: 10 treatment days       Diagnosis and Treatment Plan explained to April, April relates understanding diagnosis and is agreeable to Treatment Plan             CLIENT COMMENTS / Please share your thoughts, feelings, need and/or experiences regarding your treatment plan: _____________________________________________________________________________________________________________________________________________________________________________________________________________________________________________________________________________________________________________________ Date/Time: ______________    Patient Signature: _________________________________      Date/Time: ______________       Signature: _________________________________     Date/Time: ______________

## 2021-09-15 NOTE — PROGRESS NOTES
[facility] Discharge Instructions:   Disposition: home  Address: [place] [address]      Diagnosis:  1  Bipolar 1 disorder ([address] )     2  Post traumatic stress disorder (PTSD)           Allergies (Drug/Food): Allergies   Allergen Reactions    Amoxicillin Hives and Rash     Activity: you may not return to work until 2021  Diet:no recommendations  Smoking Cessation: The best thing you can do to improve your health is to stop using tobacco  Diagnostic/Laboratory Orders: Lipid Panel with Direct LDL reflex, Lithium level, Prolactin, CBC and Differential, comprehensive metabolic panel  Follow-up appointments/Referrals:  Medication Management: Appointment Date: 21 with PA at office  Dr Elsa Meek  [address]  [phone]     Outpatient Therapy:   Alyson Perez  [address]  [phone]       Primary Care Physician:   Dr Paco Mack   [facility] [address], [address] [place] [address] [facility]: [facility] 7884 [phone]  Crisis Intervention (Emergency) [place] Service: [facility]: [phone], [place]: [phone], [place]: [phone], [facility]): [phone], Cyndie Flores: [phone] and C/M/P: [phone]  _________________________________  National Crisis Intervention Hotline: 3-693.532.9434  National Suicide Crisis Hotline: 6-661.599.6588  I, the undersigned, have received and understand the above instructions          Patient/Rep Signature: __________________________________       Date/Time: ______________         Physician Signature: ____________________________________      Date/Time: ______________               Signature: ________________________________       Date/Time: ______________

## 2021-09-16 ENCOUNTER — OFFICE VISIT (OUTPATIENT)
Dept: PSYCHOLOGY | Facility: CLINIC | Age: 46
End: 2021-09-16
Payer: COMMERCIAL

## 2021-09-16 DIAGNOSIS — F31.9 BIPOLAR 1 DISORDER (HCC): Primary | ICD-10-CM

## 2021-09-16 DIAGNOSIS — F43.10 POST TRAUMATIC STRESS DISORDER (PTSD): ICD-10-CM

## 2021-09-16 PROCEDURE — S9480 INTENSIVE OUTPATIENT PSYCHIA: HCPCS

## 2021-09-16 NOTE — PSYCH
Subjective:     Patient ID: Joycelyn Velazco is a 51 y o  female  [facility] Clinical Progress Notes      Specialized Services Documentation  Therapist must complete separate progress note for each specific clinical activity in which the individual participated during the day  GROUP PSYCHOTHERAPY  ([id number]) Joycelyn Conti  attentively listened to [facility] share his life story as he co-led this session  Group encouraged power of learning about self, accepting illness and personal responsibility in recovery  Community resources reviewed in addition to personal resources like the affirmations  Progress toward goals noted  Continue psychotherapy to encourage self -awareness and healthy engagement of supports      TX Plan Objectives: 1 1, 1 2, 1 4   Therapist: VANESSA Vasquez

## 2021-09-16 NOTE — PSYCH
Subjective:     Patient ID: Joycelyn Vaughan is a 51 y o  female  [facility] Clinical Progress Notes      Specialized Services Documentation  Therapist must complete separate progress note for each specific clinical activity in which the individual participated during the day  GROUP PSYCHOTHERAPY ([id number]) Group topic for discussions was aftercare, follow ups and what to do after discharge  Group discussed challenges to finding care, available resources, personal disclosures (positive and negative) with providers  Members were educated on different types of therapy, how to find a therapist and advocating for needs when starting with a new provider  The group was empowered to take control in their discharge and therapy journey; creating a space for positive growth  April shared her understanding of different types of therapy and encouraged others to continue to look for therapists if they don't connect with the first one  Joycelyn Varghese continues to demonstrate insight and progress through activity participation and verbal disclosures in group  Continue with psychotherapy  [facility] Objectives: 1 1, 1 2, 1 4   Therapist: Reji Logan MA, [place]     Case Management Note    Reji Logan MA, [place]    Current suicide risk : Low     ([id number]) CM reviewed Relapse Prevention Plan, discharge instructions, medication list and crisis information with Joycelyn Varghese  See discharge summary for treatment details  Aftercare providers to receive discharge summary  Provided with outpatient therapy list for therapist within in her insurance network so she doesn't have to pay out of pocket  Medications changes/added/denied? No    Treatment session number: 17 IOP 8    Individual Case Management Visit provided today?  Yes     [facility] follow up physician's orders:    DATE 09/16/21  TIME 2:03 PM  Rae Adams MD
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
## 2021-09-16 NOTE — PSYCH
Subjective:     Patient ID: Joycelyn Echavarria is a 51 y o  female  [facility] Clinical Progress Notes      Specialized Services Documentation  Therapist must complete separate progress note for each specific clinical activity in which the individual participated during the day  Allied Therapy  [id number] Joycelyn Varghese actively shared in [facility] group focused on how to name, recognize and understand comfortable and uncomfortable emotions  April engaged in a group active music making activity where they first played what an uncomfortable emotion felt like to them as well as playing what a comfortable emotion felt like  Group engaged in some discussion as to what they were feeling during each of these activities, and how they felt the group was connecting  Group then wrote a struggle from this past year on a paper, and turned it into something comfortable by drawing in a [place]  April identified despair as an uncomfortable emotion  Good effort noted toward treatment goal  Continue discharge at end of treatment day  Tx Plan Objective: 1 1,1 2,1 4, Therapist:  ADAMA Foster; GLADIS Marrero    Education Therapy   [id number] Joycelyn Varghese actively shared in morning assessment and goal review  Presented as Receptive related to readiness to learn  Joycelyn Varghese did complete goal from last treatment day identifying gaining responsibility and hope  April did not present with any barriers to learning  [id number] Joycelyn Varghese engaged throughout the treatment day  Was engaged in learning related to Illness, Medication, Aftercare and Wellness Tools  Staff utilized Verbal, Written, A/V and Demonstration teaching methods  Joycelyn Varghese shared area of learning and set a goal for outside of program to go through folder and find things that she would like to hold on to        Tx Plan Objective: 1 1,1 2,1 4, Therapist:  Erasto Stephens MA, [place]; GLADIS Marrero

## 2021-09-17 ENCOUNTER — APPOINTMENT (OUTPATIENT)
Dept: PSYCHOLOGY | Facility: CLINIC | Age: 46
End: 2021-09-17
Payer: COMMERCIAL